# Patient Record
Sex: MALE | Race: WHITE | NOT HISPANIC OR LATINO | Employment: FULL TIME | RURAL
[De-identification: names, ages, dates, MRNs, and addresses within clinical notes are randomized per-mention and may not be internally consistent; named-entity substitution may affect disease eponyms.]

---

## 2020-01-14 ENCOUNTER — CLINICAL SUPPORT (OUTPATIENT)
Dept: FAMILY MEDICINE CLINIC | Facility: CLINIC | Age: 35
End: 2020-01-14

## 2020-01-14 VITALS
HEIGHT: 70 IN | SYSTOLIC BLOOD PRESSURE: 134 MMHG | WEIGHT: 179 LBS | OXYGEN SATURATION: 98 % | DIASTOLIC BLOOD PRESSURE: 86 MMHG | TEMPERATURE: 98.4 F | BODY MASS INDEX: 25.62 KG/M2 | RESPIRATION RATE: 18 BRPM | HEART RATE: 87 BPM

## 2020-01-14 DIAGNOSIS — Z02.4 ENCOUNTER FOR CDL (COMMERCIAL DRIVING LICENSE) EXAM: Primary | ICD-10-CM

## 2020-01-14 LAB
BILIRUB BLD-MCNC: NEGATIVE MG/DL
CLARITY, POC: CLEAR
COLOR UR: YELLOW
GLUCOSE UR STRIP-MCNC: NEGATIVE MG/DL
KETONES UR QL: NEGATIVE
LEUKOCYTE EST, POC: NEGATIVE
NITRITE UR-MCNC: NEGATIVE MG/ML
PH UR: 7 [PH] (ref 5–8)
PROT UR STRIP-MCNC: NEGATIVE MG/DL
RBC # UR STRIP: NEGATIVE /UL
SP GR UR: 1 (ref 1–1.03)
UROBILINOGEN UR QL: NORMAL

## 2020-01-14 PROCEDURE — DOTPHY: Performed by: FAMILY MEDICINE

## 2020-01-14 PROCEDURE — 81003 URINALYSIS AUTO W/O SCOPE: CPT | Performed by: FAMILY MEDICINE

## 2020-01-14 NOTE — PROGRESS NOTES
" Medical Examination    Subjective   Robert Whiteside is a 34 y.o. male who presents today for a  fitness determination physical exam. The patient reports no problems.  The following portions of the patient's history were reviewed and updated as appropriate: allergies, current medications, past family history, past medical history, past social history, past surgical history and problem list.  Review of Systems  A comprehensive review of systems was negative.    Objective    Vision:   Visual Acuity Screening    Right eye Left eye Both eyes   Without correction:      With correction: 20/20 20/20 20/20       Applicant can recognize and distinguish among traffic control signals and devices showing standard red, green, and ila colors.  Applicant has peripheral vision to 90 degrees in each eye.    Applicant meets visual acuity requirement only when wearing corrective lenses.    Monocular Vision?: No      Hearing:  Applicant can distinguish forced whisper at a distance of 5 feet with both ears         /86   Pulse 87   Temp 98.4 °F (36.9 °C)   Resp 18   Ht 177.8 cm (70\")   Wt 81.2 kg (179 lb)   SpO2 98%   BMI 25.68 kg/m²     General Appearance:    Alert, cooperative, no distress, appears stated age   Head:    Normocephalic, without obvious abnormality, atraumatic   Eyes:    PERRL, conjunctiva/corneas clear, EOM's intact, fundi     benign, both eyes        Ears:    Normal TM's and external ear canals, both ears   Nose:   Nares normal, septum midline, mucosa normal, no drainage    or sinus tenderness   Throat:   Lips, mucosa, and tongue normal; teeth and gums normal   Neck:   Supple, symmetrical, trachea midline, no adenopathy;        thyroid:  No enlargement/tenderness/nodules; no carotid    bruit or JVD   Back:     Symmetric, no curvature, ROM normal, no CVA tenderness   Lungs:     Clear to auscultation bilaterally, respirations unlabored   Chest wall:    No tenderness or " deformity   Heart:    Regular rate and rhythm, S1 and S2 normal, no murmur, rub   or gallop   Abdomen:     Soft, non-tender, bowel sounds active all four quadrants,     no masses, no organomegaly   Genitalia:    Normal male without lesion, discharge or tenderness   Rectal:    Normal tone, normal prostate, no masses or tenderness;    guaiac negative stool   Extremities:   Extremities normal, atraumatic, no cyanosis or edema   Pulses:   2+ and symmetric all extremities   Skin:   Skin color, texture, turgor normal, no rashes or lesions   Lymph nodes:   Cervical, supraclavicular, and axillary nodes normal   Neurologic:   CNII-XII intact. Normal strength, sensation and reflexes       throughout       Labs:  Lab Results   Component Value Date    SPECGRAV 1.000 (A) 01/14/2020    BILIRUBINUR Negative 01/14/2020    GLUCOSEU NEGATIVE 04/27/2018       Assessment/Plan   Healthy male exam.   Meets standards in 49 .41;  qualifies for 2 year certificate.     Medical examiners certificate completed and printed.  Return as needed.

## 2022-08-23 ENCOUNTER — APPOINTMENT (OUTPATIENT)
Dept: RESPIRATORY THERAPY | Facility: HOSPITAL | Age: 37
End: 2022-08-23

## 2022-08-23 ENCOUNTER — HOSPITAL ENCOUNTER (EMERGENCY)
Facility: HOSPITAL | Age: 37
Discharge: HOME | End: 2022-08-23
Attending: EMERGENCY MEDICINE | Admitting: EMERGENCY MEDICINE

## 2022-08-23 ENCOUNTER — APPOINTMENT (OUTPATIENT)
Dept: GENERAL RADIOLOGY | Facility: HOSPITAL | Age: 37
End: 2022-08-23

## 2022-08-23 VITALS
SYSTOLIC BLOOD PRESSURE: 139 MMHG | BODY MASS INDEX: 20.4 KG/M2 | WEIGHT: 145.72 LBS | HEIGHT: 71 IN | TEMPERATURE: 98.3 F | HEART RATE: 67 BPM | DIASTOLIC BLOOD PRESSURE: 79 MMHG | OXYGEN SATURATION: 99 % | RESPIRATION RATE: 17 BRPM

## 2022-08-23 DIAGNOSIS — R00.2 PALPITATION: Primary | ICD-10-CM

## 2022-08-23 LAB
ALBUMIN SERPL-MCNC: 5 G/DL (ref 3.5–5.2)
ALBUMIN/GLOB SERPL: 2.6 G/DL
ALP SERPL-CCNC: 48 U/L (ref 39–117)
ALT SERPL W P-5'-P-CCNC: 18 U/L (ref 1–41)
ANION GAP SERPL CALCULATED.3IONS-SCNC: 13 MMOL/L (ref 5–15)
AST SERPL-CCNC: 19 U/L (ref 1–40)
BASOPHILS # BLD AUTO: 0.1 10*3/MM3 (ref 0–0.2)
BASOPHILS NFR BLD AUTO: 0.7 % (ref 0–1.5)
BILIRUB SERPL-MCNC: 0.3 MG/DL (ref 0–1.2)
BUN SERPL-MCNC: 14 MG/DL (ref 6–20)
BUN/CREAT SERPL: 13.9 (ref 7–25)
CALCIUM SPEC-SCNC: 9.7 MG/DL (ref 8.6–10.5)
CHLORIDE SERPL-SCNC: 102 MMOL/L (ref 98–107)
CO2 SERPL-SCNC: 27 MMOL/L (ref 22–29)
CREAT SERPL-MCNC: 1.01 MG/DL (ref 0.76–1.27)
D DIMER PPP FEU-MCNC: <0.19 MG/L (FEU) (ref 0–0.59)
DEPRECATED RDW RBC AUTO: 41.6 FL (ref 37–54)
EGFRCR SERPLBLD CKD-EPI 2021: 98.8 ML/MIN/1.73
EOSINOPHIL # BLD AUTO: 0.1 10*3/MM3 (ref 0–0.4)
EOSINOPHIL NFR BLD AUTO: 1.1 % (ref 0.3–6.2)
ERYTHROCYTE [DISTWIDTH] IN BLOOD BY AUTOMATED COUNT: 13 % (ref 12.3–15.4)
ERYTHROCYTE [SEDIMENTATION RATE] IN BLOOD: 2 MM/HR (ref 0–15)
GLOBULIN UR ELPH-MCNC: 1.9 GM/DL
GLUCOSE SERPL-MCNC: 86 MG/DL (ref 65–99)
HCT VFR BLD AUTO: 41.5 % (ref 37.5–51)
HGB BLD-MCNC: 13.6 G/DL (ref 13–17.7)
LYMPHOCYTES # BLD AUTO: 3.3 10*3/MM3 (ref 0.7–3.1)
LYMPHOCYTES NFR BLD AUTO: 40.7 % (ref 19.6–45.3)
MAGNESIUM SERPL-MCNC: 1.9 MG/DL (ref 1.6–2.6)
MCH RBC QN AUTO: 31.1 PG (ref 26.6–33)
MCHC RBC AUTO-ENTMCNC: 32.8 G/DL (ref 31.5–35.7)
MCV RBC AUTO: 94.7 FL (ref 79–97)
MONOCYTES # BLD AUTO: 0.5 10*3/MM3 (ref 0.1–0.9)
MONOCYTES NFR BLD AUTO: 5.7 % (ref 5–12)
NEUTROPHILS NFR BLD AUTO: 4.2 10*3/MM3 (ref 1.7–7)
NEUTROPHILS NFR BLD AUTO: 51.8 % (ref 42.7–76)
NRBC BLD AUTO-RTO: 0 /100 WBC (ref 0–0.2)
PLATELET # BLD AUTO: 244 10*3/MM3 (ref 140–450)
PMV BLD AUTO: 7.6 FL (ref 6–12)
POTASSIUM SERPL-SCNC: 3.7 MMOL/L (ref 3.5–5.2)
PROT SERPL-MCNC: 6.9 G/DL (ref 6–8.5)
QT INTERVAL: 327 MS
RBC # BLD AUTO: 4.38 10*6/MM3 (ref 4.14–5.8)
SODIUM SERPL-SCNC: 142 MMOL/L (ref 136–145)
TROPONIN T SERPL-MCNC: <0.01 NG/ML (ref 0–0.03)
TSH SERPL DL<=0.05 MIU/L-ACNC: 1.13 UIU/ML (ref 0.27–4.2)
WBC NRBC COR # BLD: 8.1 10*3/MM3 (ref 3.4–10.8)

## 2022-08-23 PROCEDURE — 80050 GENERAL HEALTH PANEL: CPT | Performed by: EMERGENCY MEDICINE

## 2022-08-23 PROCEDURE — 93005 ELECTROCARDIOGRAM TRACING: CPT | Performed by: EMERGENCY MEDICINE

## 2022-08-23 PROCEDURE — 83735 ASSAY OF MAGNESIUM: CPT | Performed by: EMERGENCY MEDICINE

## 2022-08-23 PROCEDURE — 99283 EMERGENCY DEPT VISIT LOW MDM: CPT

## 2022-08-23 PROCEDURE — 84484 ASSAY OF TROPONIN QUANT: CPT | Performed by: EMERGENCY MEDICINE

## 2022-08-23 PROCEDURE — 93005 ELECTROCARDIOGRAM TRACING: CPT

## 2022-08-23 PROCEDURE — 85652 RBC SED RATE AUTOMATED: CPT | Performed by: EMERGENCY MEDICINE

## 2022-08-23 PROCEDURE — 85379 FIBRIN DEGRADATION QUANT: CPT | Performed by: EMERGENCY MEDICINE

## 2022-08-23 PROCEDURE — 71045 X-RAY EXAM CHEST 1 VIEW: CPT

## 2022-08-23 PROCEDURE — 93270 REMOTE 30 DAY ECG REV/REPORT: CPT | Performed by: EMERGENCY MEDICINE

## 2022-08-23 RX ORDER — SODIUM CHLORIDE 0.9 % (FLUSH) 0.9 %
10 SYRINGE (ML) INJECTION AS NEEDED
Status: DISCONTINUED | OUTPATIENT
Start: 2022-08-23 | End: 2022-08-23 | Stop reason: HOSPADM

## 2022-08-23 NOTE — ED NOTES
Pt reports no pain now, pt states earlier today he had an episode where his heart was beating irregularly. Pt states he has had this issue all his life but since last week he states it has changed. Pt states today he has dizziness and disorientation. Pt reports starting buspirone x 4 weeks ago, and lisinopril x 1 week ago.

## 2022-08-24 NOTE — DISCHARGE INSTRUCTIONS
Continue wearing this 30-day monitor.  Follow-up with the above cardiologist call tomorrow to set up appointment.  Call your primary care doctor tomorrow for follow-up.  No caffeine.  Monitor blood pressure daily record and take to your doctor.  Return for increasing chest pain neck arm jaw pain shortness of breath dizziness passing out fevers or any other new or worsening problems or concerns return immediately to the ER.

## 2022-08-24 NOTE — ED PROVIDER NOTES
Subjective   Chief complaint palpitations heart not beating right    History of present illness a 36-year-old male who complains of a  week history of heart not beating right since seems to be irregular and having extra beats or skipping beats.  He has no chest pain neck arm jaw pain he has little bit of lightheadedness and dizziness.  No shortness of breath.  He denies any vomiting.  No recent fever chills sweats cough congestion foreign travels no leg pain or swelling.  Nothing really seems make it better or worse is been moderate intermittent for a week worse last few days.  Patient has seen his doctor had elevated blood pressure was started on lisinopril a week ago.  And it seems that accelerated since being started on that medication.  Patient states he seems to feel it more when he lays on his right side.  Nothing really makes it better.  Patient is also been weaning down off methadone through the clinic in a controlled manner.  And recently started BuSpar 4 weeks ago.  Patient has been at home treating this symptomatically.  He has been set up for a Holter monitor and potential referral to a cardiologist by his primary care.  No other complaints or associated symptoms at this time          Review of Systems   Constitutional: Negative for chills and fever.   HENT: Negative for congestion and sinus pressure.    Eyes: Negative for photophobia and visual disturbance.   Respiratory: Negative for chest tightness and shortness of breath.    Cardiovascular: Positive for palpitations. Negative for chest pain and leg swelling.   Gastrointestinal: Negative for abdominal pain and vomiting.   Endocrine: Negative for cold intolerance and heat intolerance.   Genitourinary: Negative for difficulty urinating and dysuria.   Musculoskeletal: Negative for back pain and neck pain.   Skin: Negative for color change and rash.   Neurological: Positive for light-headedness. Negative for dizziness, facial asymmetry, speech difficulty  and headaches.   Psychiatric/Behavioral: Negative for agitation and confusion.       Past Medical History:   Diagnosis Date   • History of intravenous drug use in remission    • Positive hepatitis C antibody test    Hypertension    No Known Allergies    Past Surgical History:   Procedure Laterality Date   • HERNIA REPAIR Left 1986       Family History   Problem Relation Age of Onset   • Cervical cancer Mother    • Lung disease Maternal Grandmother    • Heart disease Maternal Grandfather    • Colon cancer Other        Social History     Socioeconomic History   • Marital status:    Tobacco Use   • Smoking status: Current Every Day Smoker   • Smokeless tobacco: Never Used   Substance and Sexual Activity   • Alcohol use: Not Currently   • Drug use: Yes     Types: Marijuana, Heroin     Prior to Admission medications    Not on File     Medications lisinopril BuSpar methadone      Objective   Physical Exam  Constitutional 36-year-old male awake alert no acute distress initial blood pressure 178/120 but recheck down to 141/85.  Heart rate 72 temperature 98.3 sats 100% on room air and respiratory rate 18.  HEENT extraocular muscles are intact pupils equal round reactive sclera clear.  Neck supple no adenopathy no JVD no bruits no meningeal signs no thyroid nodules.  Lungs clear no retraction no use of accessories.  Heart regular without murmur rub.  Abdomen soft without tenderness good bowel sounds no peritoneal findings or pulsatile masses.  Extremities pulses are equal throughout upper and lower extremities no edema cords or Homans' sign no evidence of DVT.  Skin is warm and dry without rashes or cellulitic changes.  Neurologic awake alert orientated x3 no facial asymmetry normal speech without focal weakness  Procedures           ED Course      Results for orders placed or performed during the hospital encounter of 08/23/22   Comprehensive Metabolic Panel    Specimen: Blood   Result Value Ref Range    Glucose 86  65 - 99 mg/dL    BUN 14 6 - 20 mg/dL    Creatinine 1.01 0.76 - 1.27 mg/dL    Sodium 142 136 - 145 mmol/L    Potassium 3.7 3.5 - 5.2 mmol/L    Chloride 102 98 - 107 mmol/L    CO2 27.0 22.0 - 29.0 mmol/L    Calcium 9.7 8.6 - 10.5 mg/dL    Total Protein 6.9 6.0 - 8.5 g/dL    Albumin 5.00 3.50 - 5.20 g/dL    ALT (SGPT) 18 1 - 41 U/L    AST (SGOT) 19 1 - 40 U/L    Alkaline Phosphatase 48 39 - 117 U/L    Total Bilirubin 0.3 0.0 - 1.2 mg/dL    Globulin 1.9 gm/dL    A/G Ratio 2.6 g/dL    BUN/Creatinine Ratio 13.9 7.0 - 25.0    Anion Gap 13.0 5.0 - 15.0 mmol/L    eGFR 98.8 >60.0 mL/min/1.73   Troponin    Specimen: Blood   Result Value Ref Range    Troponin T <0.010 0.000 - 0.030 ng/mL   D-dimer, Quantitative    Specimen: Blood   Result Value Ref Range    D-Dimer, Quantitative <0.19 0.00 - 0.59 mg/L (FEU)   Sedimentation Rate    Specimen: Blood   Result Value Ref Range    Sed Rate 2 0 - 15 mm/hr   TSH    Specimen: Blood   Result Value Ref Range    TSH 1.130 0.270 - 4.200 uIU/mL   Magnesium    Specimen: Blood   Result Value Ref Range    Magnesium 1.9 1.6 - 2.6 mg/dL   CBC Auto Differential    Specimen: Blood   Result Value Ref Range    WBC 8.10 3.40 - 10.80 10*3/mm3    RBC 4.38 4.14 - 5.80 10*6/mm3    Hemoglobin 13.6 13.0 - 17.7 g/dL    Hematocrit 41.5 37.5 - 51.0 %    MCV 94.7 79.0 - 97.0 fL    MCH 31.1 26.6 - 33.0 pg    MCHC 32.8 31.5 - 35.7 g/dL    RDW 13.0 12.3 - 15.4 %    RDW-SD 41.6 37.0 - 54.0 fl    MPV 7.6 6.0 - 12.0 fL    Platelets 244 140 - 450 10*3/mm3    Neutrophil % 51.8 42.7 - 76.0 %    Lymphocyte % 40.7 19.6 - 45.3 %    Monocyte % 5.7 5.0 - 12.0 %    Eosinophil % 1.1 0.3 - 6.2 %    Basophil % 0.7 0.0 - 1.5 %    Neutrophils, Absolute 4.20 1.70 - 7.00 10*3/mm3    Lymphocytes, Absolute 3.30 (H) 0.70 - 3.10 10*3/mm3    Monocytes, Absolute 0.50 0.10 - 0.90 10*3/mm3    Eosinophils, Absolute 0.10 0.00 - 0.40 10*3/mm3    Basophils, Absolute 0.10 0.00 - 0.20 10*3/mm3    nRBC 0.0 0.0 - 0.2 /100 WBC   ECG 12 Lead   Result  Value Ref Range    QT Interval 327 ms     XR Chest 1 View    Result Date: 8/23/2022   1. No acute cardiopulmonary disease.   Electronically Signed By-Gold Urbina MD On:8/23/2022 8:30 PM This report was finalized on [id number] by  Gold Urbina MD.    Medications   sodium chloride 0.9 % flush 10 mL (has no administration in time range)          EKG my interpretation normal sinus rhythm rate of 90 normal axis no hypertrophy QTC of 398 no acute changes otherwise nothing old to compare with but normal EKG                                  MDM  Number of Diagnoses or Management Options  Palpitation: new and requires workup  Diagnosis management comments: Medical decision making.  Patient IV established placed on a monitor and had the above exam evaluation monitor showed a sinus rhythm without ectopy.  EKG obtained reviewed by me showed a sinus rhythm without any acute changes it was normal.  Chest x-ray obtained reviewed by me as well as radiology is unremarkable.  Labs obtained reviewed by me chemistries TSH D-dimer troponin normal sed rate normal TSH normal magnesium normal CBC normal result reviewed by me.  Patient has a heart score of 1 he has had no dysrhythmia or ectopy on the monitor.  He was restarted on lisinopril which he feels is made this worse.  The patient is also weaning off methadone.  At this point I do not see evidence of acute myocardial infarction no evidence of acute DVT pulmonary embolism or dissection or sepsis.  There are a multitude of things that can happen palpitations and does not complete list.  We placed a 30-day monitor on the patient and will follow-up refer to cardiology.  We talked about what to return for.  He voices understanding was discharged home for outpatient management.       Amount and/or Complexity of Data Reviewed  Clinical lab tests: reviewed  Tests in the radiology section of CPT®: reviewed  Tests in the medicine section of CPT®: reviewed    Risk of Complications,  Morbidity, and/or Mortality  Presenting problems: high  Diagnostic procedures: high  Management options: high    Patient Progress  Patient progress: stable      Final diagnoses:   Palpitation       ED Disposition  ED Disposition     ED Disposition   Discharge    Condition   Stable    Comment   --             Daylin Han, APRN  [address] [facility]  [address]  [place] [address]  [phone]    In 1 day      Zaki Combs MD  [address] [place] [address]  [phone]    In 1 day           Medication List      No changes were made to your prescriptions during this visit.          Constantino Solares MD  08/23/22 2058

## 2022-09-01 ENCOUNTER — OFFICE VISIT (OUTPATIENT)
Dept: CARDIOLOGY | Facility: CLINIC | Age: 37
End: 2022-09-01

## 2022-09-01 VITALS
OXYGEN SATURATION: 100 % | HEART RATE: 83 BPM | HEIGHT: 71 IN | WEIGHT: 148 LBS | DIASTOLIC BLOOD PRESSURE: 84 MMHG | BODY MASS INDEX: 20.72 KG/M2 | SYSTOLIC BLOOD PRESSURE: 144 MMHG

## 2022-09-01 DIAGNOSIS — R07.2 PRECORDIAL CHEST PAIN: ICD-10-CM

## 2022-09-01 DIAGNOSIS — I10 ESSENTIAL HYPERTENSION: ICD-10-CM

## 2022-09-01 DIAGNOSIS — R00.2 PALPITATIONS: Primary | ICD-10-CM

## 2022-09-01 PROCEDURE — 99204 OFFICE O/P NEW MOD 45 MIN: CPT | Performed by: INTERNAL MEDICINE

## 2022-09-01 RX ORDER — MELOXICAM 15 MG/1
TABLET ORAL
COMMUNITY

## 2022-09-01 RX ORDER — NEBIVOLOL 5 MG/1
5 TABLET ORAL DAILY
Qty: 90 TABLET | Refills: 1 | Status: SHIPPED | OUTPATIENT
Start: 2022-09-01 | End: 2023-03-03

## 2022-09-01 RX ORDER — LISINOPRIL 10 MG/1
TABLET ORAL
COMMUNITY
Start: 2022-08-24 | End: 2023-02-09 | Stop reason: SDUPTHER

## 2022-09-01 RX ORDER — BUSPIRONE HYDROCHLORIDE 5 MG/1
5 TABLET ORAL 3 TIMES DAILY
COMMUNITY
Start: 2022-08-18 | End: 2023-02-09

## 2022-09-01 RX ORDER — ALBUTEROL SULFATE 90 UG/1
AEROSOL, METERED RESPIRATORY (INHALATION)
COMMUNITY
End: 2022-09-01

## 2022-09-01 RX ORDER — METHADONE HYDROCHLORIDE 10 MG/5ML
11 SOLUTION ORAL DAILY
COMMUNITY
End: 2023-02-09

## 2022-09-01 NOTE — PROGRESS NOTES
Date of Office Visit: 2022  Encounter Provider: Dr. Zaki Combs  Place of Service: [facility] [place]  Patient Name: Robert Whiteside  :1985  Daylin Han APRN    Chief Complaint   Patient presents with   • Palpitations   • Consult     History of Present Illness:    I am pleased to see Mr. Chong in my office today as a new consultation.    As you know, patient is 36-year-old white gentleman whose past medical history is significant for hypertension who is referred to me for symptom of chest pain and palpitation.    In 2022, patient developed symptom of chest pain and palpitation.  He was evaluated in the emergency room and his blood pressure was noted to be high greater than 180 mm systolic.  Patient was started on lisinopril 10 mg daily.  However patient continues to have chest pain.  Patient describes palpitation as a racing of the heart.  Patient palpitation and chest pain get worse with exertion.  Patient denies any orthopnea PND no syncope or presyncope.  Patient does complain of diaphoresis with chest pain.    Patient has quit smoking 2 months ago.  He does not abuse alcohol.    Recent EKG in emergency room shows normal sinus rhythm no ST changes.    I would recommend to proceed with Holter monitor.  I suspect patient may have atrial tachyarrhythmia.  I would also proceed with stress echocardiography.  Patient has significant risk factor        Past Medical History:   Diagnosis Date   • History of intravenous drug use in remission    • Hypertension    • Positive hepatitis C antibody test          Past Surgical History:   Procedure Laterality Date   • HERNIA REPAIR Left            Current Outpatient Medications:   •  busPIRone (BUSPAR) 5 MG tablet, Take 5 mg by mouth 3 (Three) Times a Day., Disp: , Rfl:   •  lisinopril (PRINIVIL,ZESTRIL) 10 MG tablet, , Disp: , Rfl:   •  meloxicam (MOBIC) 15 MG tablet, meloxicam 15 mg tablet  TAKE 1 TABLET BY MOUTH TWICE DAILY,  "Disp: , Rfl:   •  methadone (DOLOPHINE) 10 MG/5ML solution, Take 11 mg by mouth Daily., Disp: , Rfl:   •  sertraline (ZOLOFT) 50 MG tablet, , Disp: , Rfl:   •  nebivolol (Bystolic) 5 MG tablet, Take 1 tablet by mouth Daily., Disp: 90 tablet, Rfl: 1      Social History     Socioeconomic History   • Marital status:    Tobacco Use   • Smoking status: Former Smoker     Quit date: 2022     Years since quittin.1   • Smokeless tobacco: Never Used   Vaping Use   • Vaping Use: Never used   Substance and Sexual Activity   • Alcohol use: Not Currently   • Drug use: Not Currently     Types: Marijuana, Heroin     Comment: heroin , marijuana    • Sexual activity: Defer         Review of Systems   Constitutional: Negative for chills and fever.   HENT: Negative for ear discharge and nosebleeds.    Eyes: Negative for discharge and redness.   Cardiovascular: Positive for chest pain and palpitations. Negative for orthopnea, paroxysmal nocturnal dyspnea and syncope.   Respiratory: Positive for shortness of breath. Negative for cough and wheezing.    Endocrine: Negative for heat intolerance.   Skin: Negative for rash.   Musculoskeletal: Negative for arthritis and myalgias.   Gastrointestinal: Negative for abdominal pain, melena, nausea and vomiting.   Genitourinary: Negative for dysuria and hematuria.   Neurological: Negative for dizziness, light-headedness, numbness and tremors.   Psychiatric/Behavioral: Negative for depression. The patient is not nervous/anxious.        Procedures    Procedures    No orders to display           Objective:    /84 (BP Location: Left arm, Patient Position: Sitting, Cuff Size: Adult)   Pulse 83   Ht 180.3 cm (70.98\")   Wt 67.1 kg (148 lb)   SpO2 100%   BMI 20.65 kg/m²         Constitutional:       Appearance: Well-developed.   Eyes:      General: No scleral icterus.        Right eye: No discharge.   HENT:      Head: Normocephalic and atraumatic.   Neck:      Thyroid: No " thyromegaly.      Lymphadenopathy: No cervical adenopathy.   Pulmonary:      Effort: Pulmonary effort is normal. No respiratory distress.      Breath sounds: Normal breath sounds. No wheezing. No rales.   Cardiovascular:      Normal rate. Regular rhythm.      No gallop.   Abdominal:      Tenderness: There is no abdominal tenderness.   Skin:     Findings: No erythema or rash.   Neurological:      Mental Status: Alert and oriented to person, place, and time.             Assessment:       Diagnosis Plan   1. Palpitations  Adult Stress Echo W/ Cont or Stress Agent if Necessary Per Protocol    Holter Monitor - 24 Hour    nebivolol (Bystolic) 5 MG tablet   2. Precordial chest pain  Adult Stress Echo W/ Cont or Stress Agent if Necessary Per Protocol    Holter Monitor - 24 Hour    nebivolol (Bystolic) 5 MG tablet   3. Essential hypertension  Adult Stress Echo W/ Cont or Stress Agent if Necessary Per Protocol    Holter Monitor - 24 Hour    nebivolol (Bystolic) 5 MG tablet            Plan:       MDM:    1.  Palpitation:    Patient is having palpitation.  I would proceed with Holter monitor    2.  Chest pain:    Stress echocardiography would be done    3.  Hypertension:    Patient is started on Bystolic 5 mg daily along with lisinopril

## 2022-09-15 ENCOUNTER — APPOINTMENT (OUTPATIENT)
Dept: CARDIOLOGY | Facility: HOSPITAL | Age: 37
End: 2022-09-15

## 2022-09-15 ENCOUNTER — APPOINTMENT (OUTPATIENT)
Dept: RESPIRATORY THERAPY | Facility: HOSPITAL | Age: 37
End: 2022-09-15

## 2022-09-22 ENCOUNTER — TELEPHONE (OUTPATIENT)
Dept: CARDIOLOGY | Facility: CLINIC | Age: 37
End: 2022-09-22

## 2022-09-22 NOTE — TELEPHONE ENCOUNTER
PATIENT'S GIRLFRIEND CALLED IN ASKING FOR PAPERWORK TO BE SENT TO [facility]AL SURGEON AGAIN    SHE WOULD LIKE IT FAXED AGAIN AND WOULD ALSO LIKE FOR IT TO BE EMAILED TO  [email or web address]

## 2022-09-22 NOTE — TELEPHONE ENCOUNTER
Caller: MAMIE ELLISON    Relationship: Emergency Contact    Best call back number: [phone]    What form or medical record are you requesting: CARDIAC CLEARANCE    Who is requesting this form or medical record from you: TIERRA @ DR [facility] OFFICE    How would you like to receive the form or medical records (pick-up, mail, fax): FAX    If fax, what is the fax number: [phone]    Timeframe paperwork needed: ASAP, BY 2PM PREFERABLY    Additional notes: PT IS NEEDING CARDIAC CLEARANCE FOR DENTAL PROCEDURE TOMORROW - PT HAD APPT 9.1.22 AND DENTAL OFFICE REQUIRING CLEARANCE DUE TO BEING ESTABLISHED WITH CARDIOLOGY,  - FORM MUST SPECIFY CARDIAC CLEARANCE FOR GENERAL SURGERY ANESTHESIA

## 2022-09-23 ENCOUNTER — APPOINTMENT (OUTPATIENT)
Dept: RESPIRATORY THERAPY | Facility: HOSPITAL | Age: 37
End: 2022-09-23

## 2022-09-23 ENCOUNTER — APPOINTMENT (OUTPATIENT)
Dept: CARDIOLOGY | Facility: HOSPITAL | Age: 37
End: 2022-09-23

## 2022-10-05 PROCEDURE — 93272 ECG/REVIEW INTERPRET ONLY: CPT | Performed by: INTERNAL MEDICINE

## 2022-12-26 ENCOUNTER — HOSPITAL ENCOUNTER (EMERGENCY)
Facility: HOSPITAL | Age: 37
Discharge: HOME | End: 2022-12-27
Attending: EMERGENCY MEDICINE | Admitting: EMERGENCY MEDICINE

## 2022-12-26 DIAGNOSIS — R22.0 FACIAL SWELLING: Primary | ICD-10-CM

## 2022-12-26 DIAGNOSIS — K02.9 DENTAL DECAY: ICD-10-CM

## 2022-12-26 DIAGNOSIS — L03.211 FACIAL CELLULITIS: ICD-10-CM

## 2022-12-26 PROCEDURE — 96372 THER/PROPH/DIAG INJ SC/IM: CPT

## 2022-12-26 PROCEDURE — 25010000002 CEFTRIAXONE PER 250 MG: Performed by: EMERGENCY MEDICINE

## 2022-12-26 PROCEDURE — 99283 EMERGENCY DEPT VISIT LOW MDM: CPT

## 2022-12-26 RX ORDER — TRAMADOL HYDROCHLORIDE 50 MG/1
50 TABLET ORAL EVERY 6 HOURS PRN
Qty: 12 TABLET | Refills: 0 | Status: SHIPPED | OUTPATIENT
Start: 2022-12-26 | End: 2023-02-09

## 2022-12-26 RX ORDER — TRAMADOL HYDROCHLORIDE 50 MG/1
50 TABLET ORAL ONCE
Status: COMPLETED | OUTPATIENT
Start: 2022-12-26 | End: 2022-12-26

## 2022-12-26 RX ADMIN — LIDOCAINE HYDROCHLORIDE 1 G: 10 INJECTION, SOLUTION EPIDURAL; INFILTRATION; INTRACAUDAL; PERINEURAL at 23:21

## 2022-12-26 RX ADMIN — TRAMADOL HYDROCHLORIDE 50 MG: 50 TABLET, COATED ORAL at 23:20

## 2022-12-27 VITALS
HEART RATE: 68 BPM | RESPIRATION RATE: 17 BRPM | WEIGHT: 179.9 LBS | HEIGHT: 71 IN | TEMPERATURE: 98.2 F | OXYGEN SATURATION: 99 % | SYSTOLIC BLOOD PRESSURE: 172 MMHG | BODY MASS INDEX: 25.19 KG/M2 | DIASTOLIC BLOOD PRESSURE: 110 MMHG

## 2022-12-27 RX ORDER — CLONIDINE HYDROCHLORIDE 0.1 MG/1
0.1 TABLET ORAL ONCE
Status: COMPLETED | OUTPATIENT
Start: 2022-12-27 | End: 2022-12-27

## 2022-12-27 RX ADMIN — CLONIDINE HYDROCHLORIDE 0.1 MG: 0.1 TABLET ORAL at 00:34

## 2022-12-27 NOTE — ED PROVIDER NOTES
Subjective   History of Present Illness  37-year-old male with a history of dental issues presents today with the onset of swelling to the left side of his jaw.  He reports that he was started on ibuprofen 800 mg every 8 hours today and also states he is taking amoxicillin 500 mg 4 times daily.  He reports that he has has Tylenol as well.  Reports he still has pain.  He reports that he has had subjective fever but no chills.  He denies neck pain or stiffness.  He reports no nausea vomiting or diarrhea.  He states he has a history of hypertension but no history of previous cardiac murmur.        Review of Systems   HENT: Positive for dental problem. Negative for sore throat and trouble swallowing.    Eyes: Negative for discharge.   Cardiovascular: Negative for palpitations.   Musculoskeletal: Positive for neck pain. Negative for neck stiffness.   Hematological: Does not bruise/bleed easily.   All other systems reviewed and are negative.      Past Medical History:   Diagnosis Date   • History of intravenous drug use in remission    • Hypertension    • Positive hepatitis C antibody test    States that he has had previously negative HIV test    No Known Allergies    Past Surgical History:   Procedure Laterality Date   • HERNIA REPAIR Left        Family History   Problem Relation Age of Onset   • Cervical cancer Mother    • Lung disease Maternal Grandmother    • Heart disease Maternal Grandfather    • Colon cancer Other        Social History     Socioeconomic History   • Marital status:    Tobacco Use   • Smoking status: Former     Types: Cigarettes     Quit date: 2022     Years since quittin.4   • Smokeless tobacco: Never   Vaping Use   • Vaping Use: Never used   Substance and Sexual Activity   • Alcohol use: Not Currently   • Drug use: Not Currently     Types: Marijuana, Heroin     Comment: heroin , marijuana    • Sexual activity: Defer           Objective   Physical Exam  Alert nontoxic  Romy Coma Scale 15  HEENT: Pupils reactive to light conjunctiva not injected tympanic membranes and nares unremarkable oropharynx shows multiple areas of dental decay.  There appears to be some gingival swelling around tooth #19 and 20.  There is a previous dental erosion, caries, and extraction.  There is no periapical sinus identified  Neck: Supple midline trachea there is tender submandibular and upper anterior cervical lymphadenopathy on the left no range of motion limitation in the TMJ  Procedures           ED Course      Labs Reviewed - No data to display  Medications - No data to display  No radiology results for the last day                                       MDM  Number of Diagnoses or Management Options  Risk of Complications, Morbidity, and/or Mortality  Presenting problems: high  Diagnostic procedures: high  Management options: high  General comments: Patient was discharged a prescription for tramadol, quantity 12.  He was injected with ceftriaxone prior to discharge.  He was stable at discharge and vocalized understanding of discharge instructions and warning    Patient Progress  Patient progress: improved      Final diagnoses:   Facial swelling   Facial cellulitis   Dental decay       ED Disposition  ED Disposition     ED Disposition   Discharge    Condition   Stable    Comment   --             No follow-up provider specified.       Medication List      New Prescriptions    traMADol 50 MG tablet  Commonly known as: ULTRAM  Take 1 tablet by mouth Every 6 (Six) Hours As Needed for Moderate Pain.           Where to Get Your Medications      These medications were sent to [facility] [address] [place] [address] - [phone] [facility] [phone] [address]  [address]  [place] [address]    Phone: [phone]   · traMADol 50 MG tablet          German Yee MD  12/26/22 8560

## 2022-12-27 NOTE — DISCHARGE INSTRUCTIONS
Continue amoxicillin and ibuprofen  Elevate head tonight  Call dental follow-up in the morning  Follow-up with primary care provider

## 2023-02-08 PROBLEM — S22.21XS: Status: ACTIVE | Noted: 2018-05-04

## 2023-02-08 PROBLEM — I82.409 ACUTE EMBOLISM AND THROMBOSIS OF UNSPECIFIED DEEP VEINS OF UNSPECIFIED LOWER EXTREMITY: Status: ACTIVE | Noted: 2018-05-04

## 2023-02-08 PROBLEM — L02.91 CUTANEOUS ABSCESS, UNSPECIFIED: Status: ACTIVE | Noted: 2018-05-04

## 2023-02-08 PROBLEM — B18.2 CHRONIC VIRAL HEPATITIS C: Status: ACTIVE | Noted: 2018-05-04

## 2023-02-08 PROBLEM — F19.10 OTHER PSYCHOACTIVE SUBSTANCE ABUSE, UNCOMPLICATED: Status: ACTIVE | Noted: 2018-05-04
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
## 2023-02-08 NOTE — PROGRESS NOTES
Date of Office Visit: 2023  Encounter Provider: Dr. Zaki Combs  Place of Service: [facility] CARDIOLOGY [place]  Patient Name: Robert Whiteside  :1985  Daylin Han APRN    Chief Complaint   Patient presents with   • Palpitations   • Hypertension   • Follow-up     History of Present Illness    I am pleased to see Mr. Chong in my office today as a follow-up.    As you know, patient is 37-year-old white gentleman whose past medical history is significant for hypertension who was referred to me for symptom of chest pain and palpitation.    In 2022, patient developed symptom of chest pain and palpitation.  He was evaluated in the emergency room and his blood pressure was noted to be high greater than 180 mm systolic.  Patient was started on lisinopril 10 mg daily    In 2022, I saw the patient in the office for his symptom of chest pain and blood pressure.  I added Bystolic 5 mg daily for his symptom of palpitation and blood pressure.  Patient was recommended to proceed with stress echocardiography.  Patient also underwent Holter monitor.    Holter monitor showed predominantly sinus rhythm.  No SVT or VT was noted isolated premature contraction was noted.    Patient came today.  Patient blood pressure is very high.  Patient continues to drink Mountain Dew.  Patient did not go for stress echocardiography.  He canceled the test.  Patient denies any chest pain.  Patient has mild shortness of breath.    At this stage I will increase lisinopril to 10 mg twice daily.  Continue Bystolic at current dose.  Patient is advised to call me in 1 month and if blood pressures are still high I will increase lisinopril to 20 mg twice daily.  Consider adding Cardizem CD in future if needed.        Past Medical History:   Diagnosis Date   • History of intravenous drug use in remission    • Hypertension    • Positive hepatitis C antibody test          Past Surgical History:   Procedure Laterality  Date   • HERNIA REPAIR Left            Current Outpatient Medications:   •  lisinopril (PRINIVIL,ZESTRIL) 10 MG tablet, Take 1 tablet by mouth 2 (Two) Times a Day., Disp: 180 tablet, Rfl: 1  •  meloxicam (MOBIC) 15 MG tablet, meloxicam 15 mg tablet  TAKE 1 TABLET BY MOUTH TWICE DAILY, Disp: , Rfl:   •  nebivolol (Bystolic) 5 MG tablet, Take 1 tablet by mouth Daily., Disp: 90 tablet, Rfl: 1  •  sertraline (ZOLOFT) 50 MG tablet, , Disp: , Rfl:       Social History     Socioeconomic History   • Marital status:    Tobacco Use   • Smoking status: Former     Types: Cigarettes     Quit date: 2022     Years since quittin.6   • Smokeless tobacco: Never   Vaping Use   • Vaping Use: Never used   Substance and Sexual Activity   • Alcohol use: Not Currently   • Drug use: Not Currently     Types: Marijuana, Heroin     Comment: heroin , marijuana    • Sexual activity: Defer         Review of Systems   Constitutional: Negative for chills and fever.   HENT: Negative for ear discharge and nosebleeds.    Eyes: Negative for discharge and redness.   Cardiovascular: Negative for chest pain, orthopnea, palpitations, paroxysmal nocturnal dyspnea and syncope.   Respiratory: Negative for cough, shortness of breath and wheezing.    Endocrine: Negative for heat intolerance.   Skin: Negative for rash.   Musculoskeletal: Negative for arthritis and myalgias.   Gastrointestinal: Negative for abdominal pain, melena, nausea and vomiting.   Genitourinary: Negative for dysuria and hematuria.   Neurological: Negative for dizziness, light-headedness, numbness and tremors.   Psychiatric/Behavioral: Negative for depression. The patient is not nervous/anxious.        Procedures      ECG 12 Lead    Date/Time: 2023 2:08 PM  Performed by: Zaki Combs MD  Authorized by: Zaki Combs MD   Comparison: compared with previous ECG   Similar to previous ECG  Rhythm: sinus rhythm    Clinical impression: normal ECG            ECG 12  "Lead    (Results Pending)           Objective:    BP (!) 160/105 (BP Location: Right arm, Patient Position: Sitting, Cuff Size: Large Adult)   Pulse 64   Ht 180.3 cm (70.98\")   Wt 81.2 kg (179 lb)   SpO2 97%   BMI 24.98 kg/m²         Constitutional:       Appearance: Well-developed.   Eyes:      General: No scleral icterus.        Right eye: No discharge.   HENT:      Head: Normocephalic and atraumatic.   Neck:      Thyroid: No thyromegaly.      Lymphadenopathy: No cervical adenopathy.   Pulmonary:      Effort: Pulmonary effort is normal. No respiratory distress.      Breath sounds: Normal breath sounds. No wheezing. No rales.   Cardiovascular:      Normal rate. Regular rhythm.      No gallop.   Abdominal:      Tenderness: There is no abdominal tenderness.   Skin:     Findings: No erythema or rash.   Neurological:      Mental Status: Alert and oriented to person, place, and time.             Assessment:       Diagnosis Plan   1. Palpitations  ECG 12 Lead    lisinopril (PRINIVIL,ZESTRIL) 10 MG tablet      2. Essential hypertension  lisinopril (PRINIVIL,ZESTRIL) 10 MG tablet               Plan:       MDM:    1.  Hypertension:    Increase lisinopril to 20 mg a day with 10 mg twice daily.  Blood pressure monitoring is recommended.  If needed increase lisinopril to 20 mg twice daily.    2.  Palpitation:    Symptoms are contained with Bystolic.  "

## 2023-02-09 ENCOUNTER — OFFICE VISIT (OUTPATIENT)
Dept: CARDIOLOGY | Facility: CLINIC | Age: 38
End: 2023-02-09
Payer: MEDICAID

## 2023-02-09 VITALS
HEIGHT: 71 IN | BODY MASS INDEX: 25.06 KG/M2 | HEART RATE: 64 BPM | OXYGEN SATURATION: 97 % | DIASTOLIC BLOOD PRESSURE: 105 MMHG | WEIGHT: 179 LBS | SYSTOLIC BLOOD PRESSURE: 160 MMHG

## 2023-02-09 DIAGNOSIS — I10 ESSENTIAL HYPERTENSION: ICD-10-CM

## 2023-02-09 DIAGNOSIS — R00.2 PALPITATIONS: Primary | ICD-10-CM

## 2023-02-09 PROCEDURE — 99213 OFFICE O/P EST LOW 20 MIN: CPT | Performed by: INTERNAL MEDICINE

## 2023-02-09 PROCEDURE — 93000 ELECTROCARDIOGRAM COMPLETE: CPT | Performed by: INTERNAL MEDICINE

## 2023-02-09 RX ORDER — LISINOPRIL 10 MG/1
10 TABLET ORAL 2 TIMES DAILY
Qty: 180 TABLET | Refills: 1 | Status: SHIPPED | OUTPATIENT
Start: 2023-02-09 | End: 2023-03-06

## 2023-03-03 DIAGNOSIS — R00.2 PALPITATIONS: ICD-10-CM

## 2023-03-03 DIAGNOSIS — R07.2 PRECORDIAL CHEST PAIN: ICD-10-CM

## 2023-03-03 DIAGNOSIS — I10 ESSENTIAL HYPERTENSION: ICD-10-CM

## 2023-03-03 RX ORDER — NEBIVOLOL 5 MG/1
TABLET ORAL
Qty: 90 TABLET | Refills: 0 | Status: SHIPPED | OUTPATIENT
Start: 2023-03-03

## 2023-03-06 RX ORDER — LISINOPRIL 20 MG/1
10 TABLET ORAL 2 TIMES DAILY
Qty: 90 TABLET | Refills: 1 | Status: SHIPPED | OUTPATIENT
Start: 2023-03-06 | End: 2023-03-17

## 2023-03-17 RX ORDER — LISINOPRIL 40 MG/1
40 TABLET ORAL DAILY
Qty: 90 TABLET | Refills: 3 | Status: SHIPPED | OUTPATIENT
Start: 2023-03-17

## 2023-04-13 NOTE — PROGRESS NOTES
Date of Office Visit: 2023  Encounter Provider: Dr. Zaki Combs  Place of Service: [facility] CARDIOLOGY [place]  Patient Name: Robert Whiteside  :1985  Daylin Han APRN    Chief Complaint   Patient presents with   • Hypertension   • Palpitations   • Follow-up     History of Present Illness    I am pleased to see Mr. Chong in my office today as a follow-up.    As you know, patient is 37-year-old white gentleman whose past medical history is significant for hypertension who was referred to me for symptom of chest pain and palpitation.    In 2022, patient developed symptom of chest pain and palpitation.  He was evaluated in the emergency room and his blood pressure was noted to be high greater than 180 mm systolic.  Patient was started on lisinopril 10 mg daily    In 2022, I saw the patient in the office for his symptom of chest pain and blood pressure.  I added Bystolic 5 mg daily for his symptom of palpitation and blood pressure.  Patient was recommended to proceed with stress echocardiography.  Patient also underwent Holter monitor.    Patient came today for follow-up he brought his blood pressure logbook and all the blood pressure readings are very high.  Unfortunately patient did not go for stress test because of poorly controlled hypertension.  Patient denies any chest pain.  No orthopnea PND no syncope or presyncope.  No leg edema noted.    Patient is taking lisinopril 20 mg twice daily.  I will discontinue Bystolic.  I would start Cardizem CD1 80 mg daily.  Hydrochlorothiazide 25 mg daily would be started.  Patient is using significant amount of salt in his diet.  I advised the patient to discontinue it.  Consider hydralazine in future        Past Medical History:   Diagnosis Date   • History of intravenous drug use in remission    • Hypertension    • Positive hepatitis C antibody test          Past Surgical History:   Procedure Laterality Date   • HERNIA REPAIR  Left            Current Outpatient Medications:   •  lisinopril (PRINIVIL,ZESTRIL) 40 MG tablet, Take 1 tablet by mouth Daily., Disp: 90 tablet, Rfl: 3  •  meloxicam (MOBIC) 15 MG tablet, meloxicam 15 mg tablet  TAKE 1 TABLET BY MOUTH TWICE DAILY, Disp: , Rfl:   •  sertraline (ZOLOFT) 50 MG tablet, , Disp: , Rfl:   •  dilTIAZem CD (Cardizem CD) 180 MG 24 hr capsule, Take 1 capsule by mouth Daily., Disp: 90 capsule, Rfl: 1  •  hydroCHLOROthiazide (HYDRODIURIL) 25 MG tablet, Take 1 tablet by mouth Daily., Disp: 90 tablet, Rfl: 3      Social History     Socioeconomic History   • Marital status:    Tobacco Use   • Smoking status: Former     Packs/day: 1.00     Types: Cigarettes     Quit date: 2022     Years since quittin.7   • Smokeless tobacco: Never   Vaping Use   • Vaping Use: Never used   Substance and Sexual Activity   • Alcohol use: Not Currently   • Drug use: Not Currently     Types: Marijuana, Heroin     Comment: heroin , marijuana    • Sexual activity: Defer         Review of Systems   Constitutional: Negative for chills and fever.   HENT: Negative for ear discharge and nosebleeds.    Eyes: Negative for discharge and redness.   Cardiovascular: Negative for chest pain, orthopnea, palpitations, paroxysmal nocturnal dyspnea and syncope.   Respiratory: Negative for cough, shortness of breath and wheezing.    Endocrine: Negative for heat intolerance.   Skin: Negative for rash.   Musculoskeletal: Negative for arthritis and myalgias.   Gastrointestinal: Negative for abdominal pain, melena, nausea and vomiting.   Genitourinary: Negative for dysuria and hematuria.   Neurological: Negative for dizziness, light-headedness, numbness and tremors.   Psychiatric/Behavioral: Negative for depression. The patient is not nervous/anxious.        Procedures    Procedures    No orders to display           Objective:    BP (!) 177/116 (BP Location: Right arm, Patient Position: Sitting, Cuff Size: Large  "Adult)   Pulse 63   Ht 180.3 cm (70.98\")   Wt 81.6 kg (180 lb)   SpO2 100%   BMI 25.12 kg/m²         Constitutional:       Appearance: Well-developed.   Eyes:      General: No scleral icterus.        Right eye: No discharge.   HENT:      Head: Normocephalic and atraumatic.   Neck:      Thyroid: No thyromegaly.      Lymphadenopathy: No cervical adenopathy.   Pulmonary:      Effort: Pulmonary effort is normal. No respiratory distress.      Breath sounds: Normal breath sounds. No wheezing. No rales.   Cardiovascular:      Normal rate. Regular rhythm.      No gallop.   Abdominal:      Tenderness: There is no abdominal tenderness.   Skin:     Findings: No erythema or rash.   Neurological:      Mental Status: Alert and oriented to person, place, and time.             Assessment:       Diagnosis Plan   1. Palpitations  dilTIAZem CD (Cardizem CD) 180 MG 24 hr capsule    hydroCHLOROthiazide (HYDRODIURIL) 25 MG tablet    Adult Transthoracic Echo Complete W/ Cont if Necessary Per Protocol      2. Benign essential HTN  dilTIAZem CD (Cardizem CD) 180 MG 24 hr capsule    hydroCHLOROthiazide (HYDRODIURIL) 25 MG tablet    Adult Transthoracic Echo Complete W/ Cont if Necessary Per Protocol               Plan:       MDM:    1.  Hypertension:    I will start Cardizem  mg daily.  Add hydrochlorothiazide.  Patient is advised to decrease salt intake    2.  Palpitation:    His palpitations are much better.  Continue Cardizem CD discontinue Bystolic.  "

## 2023-04-14 ENCOUNTER — OFFICE VISIT (OUTPATIENT)
Dept: CARDIOLOGY | Facility: CLINIC | Age: 38
End: 2023-04-14
Payer: MEDICAID

## 2023-04-14 VITALS
WEIGHT: 180 LBS | HEIGHT: 71 IN | DIASTOLIC BLOOD PRESSURE: 116 MMHG | HEART RATE: 63 BPM | SYSTOLIC BLOOD PRESSURE: 177 MMHG | BODY MASS INDEX: 25.2 KG/M2 | OXYGEN SATURATION: 100 %

## 2023-04-14 DIAGNOSIS — I10 BENIGN ESSENTIAL HTN: ICD-10-CM

## 2023-04-14 DIAGNOSIS — R00.2 PALPITATIONS: Primary | ICD-10-CM

## 2023-04-14 PROCEDURE — 1160F RVW MEDS BY RX/DR IN RCRD: CPT | Performed by: INTERNAL MEDICINE

## 2023-04-14 PROCEDURE — 99213 OFFICE O/P EST LOW 20 MIN: CPT | Performed by: INTERNAL MEDICINE

## 2023-04-14 PROCEDURE — 3077F SYST BP >= 140 MM HG: CPT | Performed by: INTERNAL MEDICINE

## 2023-04-14 PROCEDURE — 3080F DIAST BP >= 90 MM HG: CPT | Performed by: INTERNAL MEDICINE

## 2023-04-14 PROCEDURE — 1159F MED LIST DOCD IN RCRD: CPT | Performed by: INTERNAL MEDICINE

## 2023-04-14 RX ORDER — DILTIAZEM HYDROCHLORIDE 180 MG/1
180 CAPSULE, COATED, EXTENDED RELEASE ORAL DAILY
Qty: 90 CAPSULE | Refills: 1 | Status: SHIPPED | OUTPATIENT
Start: 2023-04-14

## 2023-04-14 RX ORDER — HYDROCHLOROTHIAZIDE 25 MG/1
25 TABLET ORAL DAILY
Qty: 90 TABLET | Refills: 3 | Status: SHIPPED | OUTPATIENT
Start: 2023-04-14

## 2023-07-27 ENCOUNTER — HOSPITAL ENCOUNTER (OUTPATIENT)
Dept: CARDIOLOGY | Facility: HOSPITAL | Age: 38
Discharge: HOME | End: 2023-07-27
Payer: MEDICAID

## 2023-07-27 VITALS
SYSTOLIC BLOOD PRESSURE: 138 MMHG | BODY MASS INDEX: 25.31 KG/M2 | WEIGHT: 180.78 LBS | DIASTOLIC BLOOD PRESSURE: 79 MMHG | HEIGHT: 71 IN

## 2023-07-27 DIAGNOSIS — I10 BENIGN ESSENTIAL HTN: ICD-10-CM

## 2023-07-27 DIAGNOSIS — R00.2 PALPITATIONS: ICD-10-CM

## 2023-07-27 LAB
BH CV ECHO MEAS - ACS: 1.79 CM
BH CV ECHO MEAS - AO MAX PG: 7.5 MMHG
BH CV ECHO MEAS - AO MEAN PG: 4 MMHG
BH CV ECHO MEAS - AO ROOT DIAM: 2.9 CM
BH CV ECHO MEAS - AO V2 MAX: 136.8 CM/SEC
BH CV ECHO MEAS - AO V2 VTI: 25.6 CM
BH CV ECHO MEAS - AVA(I,D): 2.6 CM2
BH CV ECHO MEAS - EDV(CUBED): 111.9 ML
BH CV ECHO MEAS - EDV(MOD-SP4): 90.2 ML
BH CV ECHO MEAS - EF(MOD-BP): 55 %
BH CV ECHO MEAS - EF(MOD-SP4): 54.1 %
BH CV ECHO MEAS - ESV(CUBED): 40.5 ML
BH CV ECHO MEAS - ESV(MOD-SP4): 41.4 ML
BH CV ECHO MEAS - FS: 28.7 %
BH CV ECHO MEAS - IVS/LVPW: 1.01 CM
BH CV ECHO MEAS - IVSD: 1 CM
BH CV ECHO MEAS - LA DIMENSION: 3 CM
BH CV ECHO MEAS - LV DIASTOLIC VOL/BSA (35-75): 44.7 CM2
BH CV ECHO MEAS - LV MASS(C)D: 171.2 GRAMS
BH CV ECHO MEAS - LV MAX PG: 4.2 MMHG
BH CV ECHO MEAS - LV MEAN PG: 2.19 MMHG
BH CV ECHO MEAS - LV SYSTOLIC VOL/BSA (12-30): 20.5 CM2
BH CV ECHO MEAS - LV V1 MAX: 102.3 CM/SEC
BH CV ECHO MEAS - LV V1 VTI: 19.2 CM
BH CV ECHO MEAS - LVIDD: 4.8 CM
BH CV ECHO MEAS - LVIDS: 3.4 CM
BH CV ECHO MEAS - LVOT AREA: 3.5 CM2
BH CV ECHO MEAS - LVOT DIAM: 2.12 CM
BH CV ECHO MEAS - LVPWD: 1 CM
BH CV ECHO MEAS - MR MAX PG: 67.6 MMHG
BH CV ECHO MEAS - MR MAX VEL: 411 CM/SEC
BH CV ECHO MEAS - MV A MAX VEL: 59.5 CM/SEC
BH CV ECHO MEAS - MV DEC SLOPE: 294.2 CM/SEC2
BH CV ECHO MEAS - MV DEC TIME: 0.24 MSEC
BH CV ECHO MEAS - MV E MAX VEL: 69.8 CM/SEC
BH CV ECHO MEAS - MV E/A: 1.17
BH CV ECHO MEAS - MV MAX PG: 2.47 MMHG
BH CV ECHO MEAS - MV MEAN PG: 1.06 MMHG
BH CV ECHO MEAS - MV V2 VTI: 27.7 CM
BH CV ECHO MEAS - MVA(VTI): 2.45 CM2
BH CV ECHO MEAS - PA ACC TIME: 0.09 SEC
BH CV ECHO MEAS - PA V2 MAX: 119.4 CM/SEC
BH CV ECHO MEAS - PI END-D VEL: 138 CM/SEC
BH CV ECHO MEAS - PULM A REVS DUR: 0.07 SEC
BH CV ECHO MEAS - PULM A REVS VEL: 27 CM/SEC
BH CV ECHO MEAS - PULM DIAS VEL: 59.1 CM/SEC
BH CV ECHO MEAS - PULM S/D: 0.8
BH CV ECHO MEAS - PULM SYS VEL: 47.5 CM/SEC
BH CV ECHO MEAS - RAP SYSTOLE: 10 MMHG
BH CV ECHO MEAS - RVSP: 39.2 MMHG
BH CV ECHO MEAS - SI(MOD-SP4): 24.2 ML/M2
BH CV ECHO MEAS - SV(LVOT): 67.9 ML
BH CV ECHO MEAS - SV(MOD-SP4): 48.8 ML
BH CV ECHO MEAS - TAPSE (>1.6): 2.6 CM
BH CV ECHO MEAS - TR MAX PG: 29.2 MMHG
BH CV ECHO MEAS - TR MAX VEL: 264.2 CM/SEC
BH CV XLRA - RV BASE: 3.3 CM
BH CV XLRA - RV MID: 1.9 CM

## 2023-07-27 PROCEDURE — 93306 TTE W/DOPPLER COMPLETE: CPT

## 2023-08-08 LAB
BH CV ECHO MEAS - ACS: 1.79 CM
BH CV ECHO MEAS - AO MAX PG: 7.5 MMHG
BH CV ECHO MEAS - AO MEAN PG: 4 MMHG
BH CV ECHO MEAS - AO ROOT DIAM: 2.9 CM
BH CV ECHO MEAS - AO V2 MAX: 136.8 CM/SEC
BH CV ECHO MEAS - AO V2 VTI: 25.6 CM
BH CV ECHO MEAS - AVA(I,D): 2.6 CM2
BH CV ECHO MEAS - EDV(CUBED): 111.9 ML
BH CV ECHO MEAS - EDV(MOD-SP4): 90.2 ML
BH CV ECHO MEAS - EF(MOD-BP): 55 %
BH CV ECHO MEAS - EF(MOD-SP4): 54.1 %
BH CV ECHO MEAS - ESV(CUBED): 40.5 ML
BH CV ECHO MEAS - ESV(MOD-SP4): 41.4 ML
BH CV ECHO MEAS - FS: 28.7 %
BH CV ECHO MEAS - IVS/LVPW: 1.01 CM
BH CV ECHO MEAS - IVSD: 1 CM
BH CV ECHO MEAS - LA DIMENSION: 3 CM
BH CV ECHO MEAS - LV DIASTOLIC VOL/BSA (35-75): 44.7 CM2
BH CV ECHO MEAS - LV MASS(C)D: 171.2 GRAMS
BH CV ECHO MEAS - LV MAX PG: 4.2 MMHG
BH CV ECHO MEAS - LV MEAN PG: 2.19 MMHG
BH CV ECHO MEAS - LV SYSTOLIC VOL/BSA (12-30): 20.5 CM2
BH CV ECHO MEAS - LV V1 MAX: 102.3 CM/SEC
BH CV ECHO MEAS - LV V1 VTI: 19.2 CM
BH CV ECHO MEAS - LVIDD: 4.8 CM
BH CV ECHO MEAS - LVIDS: 3.4 CM
BH CV ECHO MEAS - LVOT AREA: 3.5 CM2
BH CV ECHO MEAS - LVOT DIAM: 2.12 CM
BH CV ECHO MEAS - LVPWD: 1 CM
BH CV ECHO MEAS - MR MAX PG: 67.6 MMHG
BH CV ECHO MEAS - MR MAX VEL: 411 CM/SEC
BH CV ECHO MEAS - MV A MAX VEL: 59.5 CM/SEC
BH CV ECHO MEAS - MV DEC SLOPE: 294.2 CM/SEC2
BH CV ECHO MEAS - MV DEC TIME: 0.24 MSEC
BH CV ECHO MEAS - MV E MAX VEL: 69.8 CM/SEC
BH CV ECHO MEAS - MV E/A: 1.17
BH CV ECHO MEAS - MV MAX PG: 2.47 MMHG
BH CV ECHO MEAS - MV MEAN PG: 1.06 MMHG
BH CV ECHO MEAS - MV V2 VTI: 27.7 CM
BH CV ECHO MEAS - MVA(VTI): 2.45 CM2
BH CV ECHO MEAS - PA ACC TIME: 0.09 SEC
BH CV ECHO MEAS - PA V2 MAX: 119.4 CM/SEC
BH CV ECHO MEAS - PI END-D VEL: 138 CM/SEC
BH CV ECHO MEAS - PULM A REVS DUR: 0.07 SEC
BH CV ECHO MEAS - PULM A REVS VEL: 27 CM/SEC
BH CV ECHO MEAS - PULM DIAS VEL: 59.1 CM/SEC
BH CV ECHO MEAS - PULM S/D: 0.8
BH CV ECHO MEAS - PULM SYS VEL: 47.5 CM/SEC
BH CV ECHO MEAS - RAP SYSTOLE: 10 MMHG
BH CV ECHO MEAS - RVSP: 38 MMHG
BH CV ECHO MEAS - SI(MOD-SP4): 24.2 ML/M2
BH CV ECHO MEAS - SV(LVOT): 67.9 ML
BH CV ECHO MEAS - SV(MOD-SP4): 48.8 ML
BH CV ECHO MEAS - TAPSE (>1.6): 2.6 CM
BH CV ECHO MEAS - TR MAX PG: 28 MMHG
BH CV ECHO MEAS - TR MAX VEL: 264.2 CM/SEC
BH CV XLRA - RV BASE: 3.3 CM
BH CV XLRA - RV MID: 1.9 CM

## 2023-08-23 NOTE — PROGRESS NOTES
Date of Office Visit: 2023  Encounter Provider: Dr. Zaki Combs  Place of Service: [facility] CARDIOLOGY [place]  Patient Name: Robert Whiteside  :1985  Daylin Han APRN    Chief Complaint   Patient presents with    Palpitations    Hypertension    Follow-up     History of Present Illness    I am pleased to see Mr. Chong in my office today as a follow-up.    As you know, patient is 37-year-old white gentleman whose past medical history is significant for hypertension who was referred to me for symptom of chest pain and palpitation.    In 2022, patient developed symptom of chest pain and palpitation.  He was evaluated in the emergency room and his blood pressure was noted to be high greater than 180 mm systolic.  Patient was started on lisinopril 10 mg daily    In 2022, I saw the patient in the office for his symptom of chest pain and blood pressure.  I added Bystolic 5 mg daily for his symptom of palpitation and blood pressure.  Patient was recommended to proceed with stress echocardiography.  Patient also underwent Holter monitor.    In 2023, patient underwent echocardiogram which showed normal left ventricular size and function with EF of 55 to 60% and no significant valvular heart disease noted.  Holter monitor showed isolated PVCs no significant arrhythmia burden noted.  Insurance company and refuted stress test.    Patient came today for follow-up.  After adjustment of the medication has been blood pressure is very well controlled.  Patient denies any symptom of chest pain or tightness or heaviness.  Patient denies any orthopnea,.  Syncope or presyncope.  No leg edema noted.    EKG showed normal sinus rhythm with heart rate of 95 bpm    I am very pleased with the patient progress and control of blood pressure.  Patient is advised to continue monitoring the blood pressure.  Decrease salt intake.  Patient is advised to continue lisinopril and  diltiazem.        Past Medical History:   Diagnosis Date    History of intravenous drug use in remission     Hypertension     Positive hepatitis C antibody test          Past Surgical History:   Procedure Laterality Date    HERNIA REPAIR Left            Current Outpatient Medications:     dilTIAZem CD (CARDIZEM CD) 180 MG 24 hr capsule, Take 1 capsule by mouth Daily., Disp: , Rfl:     hydroCHLOROthiazide (HYDRODIURIL) 25 MG tablet, Take 1 tablet by mouth Daily., Disp: 90 tablet, Rfl: 3    lisinopril (PRINIVIL,ZESTRIL) 40 MG tablet, Take 1 tablet by mouth Daily., Disp: 90 tablet, Rfl: 3    meloxicam (MOBIC) 15 MG tablet, meloxicam 15 mg tablet  TAKE 1 TABLET BY MOUTH TWICE DAILY, Disp: , Rfl:     sertraline (ZOLOFT) 50 MG tablet, , Disp: , Rfl:       Social History     Socioeconomic History    Marital status:    Tobacco Use    Smoking status: Former     Packs/day: 1.00     Types: Cigarettes     Quit date: 2022     Years since quittin.1    Smokeless tobacco: Never   Vaping Use    Vaping Use: Never used   Substance and Sexual Activity    Alcohol use: Not Currently    Drug use: Not Currently     Types: Marijuana, Heroin     Comment: heroin , marijuana     Sexual activity: Defer         Review of Systems   Constitutional: Negative for chills and fever.   HENT:  Negative for ear discharge and nosebleeds.    Eyes:  Negative for discharge and redness.   Cardiovascular:  Negative for chest pain, orthopnea, palpitations, paroxysmal nocturnal dyspnea and syncope.   Respiratory:  Positive for shortness of breath. Negative for cough and wheezing.    Endocrine: Negative for heat intolerance.   Skin:  Negative for rash.   Musculoskeletal:  Negative for arthritis and myalgias.   Gastrointestinal:  Negative for abdominal pain, melena, nausea and vomiting.   Genitourinary:  Negative for dysuria and hematuria.   Neurological:  Negative for dizziness, light-headedness, numbness and tremors.  "  Psychiatric/Behavioral:  Negative for depression. The patient is not nervous/anxious.      Procedures      ECG 12 Lead    Date/Time: 8/25/2023 10:49 AM  Performed by: Zaki Combs MD  Authorized by: Zaki Combs MD   Comparison: compared with previous ECG   Similar to previous ECG  Rhythm: sinus rhythm    Clinical impression: normal ECG        ECG 12 Lead    (Results Pending)           Objective:    /83 (BP Location: Right arm, Patient Position: Sitting, Cuff Size: Large Adult)   Pulse 95   Ht 180.3 cm (70.98\")   Wt 81.6 kg (180 lb)   SpO2 97%   BMI 25.12 kg/mý         Constitutional:       Appearance: Well-developed.   Eyes:      General: No scleral icterus.        Right eye: No discharge.   HENT:      Head: Normocephalic and atraumatic.   Neck:      Thyroid: No thyromegaly.      Lymphadenopathy: No cervical adenopathy.   Pulmonary:      Effort: Pulmonary effort is normal. No respiratory distress.      Breath sounds: Normal breath sounds. No wheezing. No rales.   Cardiovascular:      Normal rate. Regular rhythm.      No gallop.    Edema:     Peripheral edema absent.   Abdominal:      Tenderness: There is no abdominal tenderness.   Skin:     Findings: No erythema or rash.   Neurological:      Mental Status: Alert and oriented to person, place, and time.           Assessment:       Diagnosis Plan   1. Palpitations  ECG 12 Lead      2. Benign essential HTN  ECG 12 Lead               Plan:       MDM:    1.  Hypertension:    Patient blood pressure is controlled.  Continue lisinopril and diltiazem    2.  Palpitation:    Holter monitor showed isolated premature contraction.  With diltiazem symptoms are controlled continue current treatment    3.  Anxiety disorder:    Patient is on Zoloft.  Patient is doing well  "

## 2023-08-25 ENCOUNTER — OFFICE VISIT (OUTPATIENT)
Dept: CARDIOLOGY | Facility: CLINIC | Age: 38
End: 2023-08-25
Payer: MEDICAID

## 2023-08-25 VITALS
BODY MASS INDEX: 25.2 KG/M2 | HEART RATE: 95 BPM | OXYGEN SATURATION: 97 % | SYSTOLIC BLOOD PRESSURE: 117 MMHG | HEIGHT: 71 IN | DIASTOLIC BLOOD PRESSURE: 83 MMHG | WEIGHT: 180 LBS

## 2023-08-25 DIAGNOSIS — I10 BENIGN ESSENTIAL HTN: ICD-10-CM

## 2023-08-25 DIAGNOSIS — R00.2 PALPITATIONS: Primary | ICD-10-CM

## 2023-08-25 PROCEDURE — 3079F DIAST BP 80-89 MM HG: CPT | Performed by: INTERNAL MEDICINE

## 2023-08-25 PROCEDURE — 1159F MED LIST DOCD IN RCRD: CPT | Performed by: INTERNAL MEDICINE

## 2023-08-25 PROCEDURE — 1160F RVW MEDS BY RX/DR IN RCRD: CPT | Performed by: INTERNAL MEDICINE

## 2023-08-25 PROCEDURE — 93000 ELECTROCARDIOGRAM COMPLETE: CPT | Performed by: INTERNAL MEDICINE

## 2023-08-25 PROCEDURE — 99214 OFFICE O/P EST MOD 30 MIN: CPT | Performed by: INTERNAL MEDICINE

## 2023-08-25 PROCEDURE — 3074F SYST BP LT 130 MM HG: CPT | Performed by: INTERNAL MEDICINE

## 2023-08-25 RX ORDER — DILTIAZEM HYDROCHLORIDE 180 MG/1
180 CAPSULE, COATED, EXTENDED RELEASE ORAL DAILY
COMMUNITY

## 2023-09-13 RX ORDER — ATORVASTATIN CALCIUM 40 MG/1
40 TABLET, FILM COATED ORAL DAILY
Qty: 90 TABLET | Refills: 3 | Status: SHIPPED | OUTPATIENT
Start: 2023-09-13

## 2023-09-29 RX ORDER — DILTIAZEM HYDROCHLORIDE 180 MG/1
180 CAPSULE, COATED, EXTENDED RELEASE ORAL DAILY
Qty: 90 CAPSULE | Refills: 0 | Status: SHIPPED | OUTPATIENT
Start: 2023-09-29

## 2024-01-02 RX ORDER — DILTIAZEM HYDROCHLORIDE 180 MG/1
180 CAPSULE, COATED, EXTENDED RELEASE ORAL DAILY
Qty: 90 CAPSULE | Refills: 0 | Status: SHIPPED | OUTPATIENT
Start: 2024-01-02

## 2024-02-02 ENCOUNTER — HOSPITAL ENCOUNTER (OUTPATIENT)
Dept: GENERAL RADIOLOGY | Facility: HOSPITAL | Age: 39
Discharge: HOME | End: 2024-02-02
Payer: MEDICAID

## 2024-02-02 ENCOUNTER — OFFICE VISIT (OUTPATIENT)
Dept: FAMILY MEDICINE CLINIC | Facility: CLINIC | Age: 39
End: 2024-02-02
Payer: MEDICAID

## 2024-02-02 VITALS
HEART RATE: 78 BPM | OXYGEN SATURATION: 98 % | TEMPERATURE: 98.4 F | HEIGHT: 71 IN | RESPIRATION RATE: 20 BRPM | DIASTOLIC BLOOD PRESSURE: 84 MMHG | SYSTOLIC BLOOD PRESSURE: 135 MMHG | BODY MASS INDEX: 22.6 KG/M2 | WEIGHT: 161.4 LBS

## 2024-02-02 DIAGNOSIS — Z00.00 WELLNESS EXAMINATION: Primary | ICD-10-CM

## 2024-02-02 DIAGNOSIS — E78.2 MIXED HYPERLIPIDEMIA: ICD-10-CM

## 2024-02-02 DIAGNOSIS — F17.210 TOBACCO DEPENDENCE DUE TO CIGARETTES: ICD-10-CM

## 2024-02-02 DIAGNOSIS — I10 BENIGN ESSENTIAL HTN: ICD-10-CM

## 2024-02-02 DIAGNOSIS — K92.1 BLOOD IN STOOL: ICD-10-CM

## 2024-02-02 DIAGNOSIS — F11.11 HISTORY OF OPIOID ABUSE: ICD-10-CM

## 2024-02-02 DIAGNOSIS — F33.0 MILD EPISODE OF RECURRENT MAJOR DEPRESSIVE DISORDER: ICD-10-CM

## 2024-02-02 DIAGNOSIS — B18.2 CHRONIC HEPATITIS C WITHOUT HEPATIC COMA: ICD-10-CM

## 2024-02-02 DIAGNOSIS — R63.4 WEIGHT LOSS: ICD-10-CM

## 2024-02-02 DIAGNOSIS — G89.29 CHRONIC BACK PAIN, UNSPECIFIED BACK LOCATION, UNSPECIFIED BACK PAIN LATERALITY: ICD-10-CM

## 2024-02-02 DIAGNOSIS — M54.9 CHRONIC BACK PAIN, UNSPECIFIED BACK LOCATION, UNSPECIFIED BACK PAIN LATERALITY: ICD-10-CM

## 2024-02-02 DIAGNOSIS — Z23 NEED FOR TDAP VACCINATION: ICD-10-CM

## 2024-02-02 DIAGNOSIS — Z28.21 INFLUENZA VACCINATION DECLINED: ICD-10-CM

## 2024-02-02 PROBLEM — E78.5 HYPERLIPIDEMIA: Status: ACTIVE | Noted: 2024-02-02

## 2024-02-02 PROCEDURE — 71046 X-RAY EXAM CHEST 2 VIEWS: CPT

## 2024-02-02 RX ORDER — MELOXICAM 15 MG/1
15 TABLET ORAL DAILY
Qty: 30 TABLET | Refills: 2 | Status: SHIPPED | OUTPATIENT
Start: 2024-02-02

## 2024-02-02 NOTE — PROGRESS NOTES
Chief Complaint  Annual Exam, Hypertension, Hyperlipidemia, Back Pain, and Establish Care    Subjective          Robert is a 38 y.o. male  who presents to [facility]     Patient Care Team:  Petra Miguel APRN as PCP - General (Family Medicine)  Zaki Combs MD as Consulting Physician (Cardiology)     History of Present Illness  Robert is here today to establish care.  Previous PCP Daylin Han NP.   New patient    Adult Annual Wellness    Social History    Tobacco Use      Smoking status: Every Day        Packs/day: 1.00        Years: 27.00        Additional pack years: 0.00        Total pack years: 27.00        Types: Cigarettes        Start date: 1997      Smokeless tobacco: Never    Vaping Use      Vaping Use: Never used    Alcohol use: Not Currently    Drug use: Yes      Types: Marijuana, Heroin      Comment: No opioid use since June 2023    Caffeine use - coffee, energy drinks, Mt Dew    General health habits  Last eye exam - March 2023  Last dental exam -  2 weeks ago, had teeth pulled    Diet - Regular diet    Weight / BMI - normal, BMI 22.5    Exercise - yes; 45 minutes 1-3 days per week    Hours of sleep per night ? 5-6    Safety -   Do you use seat belts consistently ? No   Working smoke detector in home ? Yes   Do you use sunscreen when outdoors ? No     Reproductive health -  Sexually active - yes   Erectile dysfunction ? No     Screening/prevention  Colon cancer screening - not applicable due to age  Prostate cancer screening (PSA) -  not applicable due to age    Immunizations -   Tdap - given today  Pneumococcal vaccine -  no  Shingles (Shingrix) - no    ++++++++++++++++++++++++    Patient presents for follow-up of HTN  This is an established problem.    Interim treatment changes: none  Current medications: lisinopril 40 mg daily, HCTZ 25 mg daily, diltiazem 180 mg daily  Checking blood pressure at home ? Yes sometimes    Patient presents for follow-up of  hyperlipidemia  This is an established problem  Interim treatment changes: started atorvastatin in Fall 2023  Current medications: atorvastatin 40 mg daily    Patient presents for follow-up of depression and anxiety  This is an established problem  Interim treatment changes: none  Current medications: sertraline 50 mg daily  PHQ-9 =  5 on 2/2/2024  PRIMITIVO-7 = 11 on 2/2/2024    Patient presents for follow-up of back pain  This is an established problem  Interim treatment changes:  Current medications: meloxicam 15 mg daily  He would like a refill of meloxicam  He has had prior imaging of his back.   He saw [facility] for his back in June 2022 and it was recommended he complete PT    Hx opioid abuse  He went to methadone clinic for 1 year  Relapse in June 2023.    Overdose x2 (both in June 2023)  Went to [facility] with second overdose  He had inpatient therapy in 2016 and 2018  Clean off opiods since June 2023        Robert Whiteside  has a past medical history of Depression, History of intravenous drug use in remission, History of opioid abuse, Hyperlipidemia, Hypertension, and Positive hepatitis C antibody test.      Review of Systems   Constitutional:  Positive for unexpected weight change. Negative for fatigue and fever.   HENT:  Negative for ear pain and sore throat.    Eyes:  Negative for visual disturbance.   Respiratory:  Positive for cough. Negative for shortness of breath.    Cardiovascular:  Positive for palpitations (improved). Negative for chest pain and leg swelling.   Gastrointestinal:  Positive for blood in stool and diarrhea (loose). Negative for abdominal distention, abdominal pain, constipation, nausea and vomiting.        No hearburn   Endocrine: Negative for cold intolerance, heat intolerance, polydipsia, polyphagia and polyuria.   Genitourinary:  Negative for dysuria, frequency, hematuria, penile discharge, scrotal swelling, testicular pain and urgency.   Musculoskeletal:  Positive for back pain.    Skin:  Negative for rash.   Neurological:  Negative for dizziness, tremors, weakness and headaches.   Psychiatric/Behavioral:  Positive for dysphoric mood. Negative for suicidal ideas. The patient is nervous/anxious.         Family History   Problem Relation Age of Onset    Cervical cancer Mother     Lung cancer Mother     Drug abuse Father     Leukemia Brother     Lung disease Maternal Grandmother     Lung cancer Maternal Grandmother     Heart disease Maternal Grandfather     Stroke Maternal Grandfather     Colon cancer Other     No Known Problems Daughter         Past Surgical History:   Procedure Laterality Date    EXCISION  04/27/2018    excision manubrio-clavicular joint; Dr. Cheung    HERNIA REPAIR Left 1986    INCISION AND DRAINAGE ABSCESS  04/27/2018    neck    WRIST FRACTURE SURGERY Left         Social History     Socioeconomic History    Marital status:     Number of children: 1    Highest education level: GED or equivalent   Tobacco Use    Smoking status: Every Day     Packs/day: 1.00     Years: 27.00     Additional pack years: 0.00     Total pack years: 27.00     Types: Cigarettes     Start date: 1997    Smokeless tobacco: Never   Vaping Use    Vaping Use: Never used   Substance and Sexual Activity    Alcohol use: Not Currently    Drug use: Yes     Types: Marijuana, Heroin     Comment: No opioid use since June 2023    Sexual activity: Yes     Partners: Female        Immunization History   Administered Date(s) Administered    DTP 02/04/1986, 04/28/1986, 07/28/1986, 06/03/1987, 11/02/1990    Hepatitis A 03/12/2019    MMR 03/23/1987, 07/17/1997    OPV 02/04/1986, 04/28/1986, 06/03/1987, 11/02/1990    Tdap 02/02/2024       Objective       Current Outpatient Medications:     atorvastatin (LIPITOR) 40 MG tablet, Take 1 tablet by mouth Daily., Disp: 90 tablet, Rfl: 3    dilTIAZem CD (CARDIZEM CD) 180 MG 24 hr capsule, Take 1 capsule by mouth once daily, Disp: 90 capsule, Rfl: 0    hydroCHLOROthiazide  "(HYDRODIURIL) 25 MG tablet, Take 1 tablet by mouth Daily., Disp: 90 tablet, Rfl: 3    lisinopril (PRINIVIL,ZESTRIL) 40 MG tablet, Take 1 tablet by mouth Daily., Disp: 90 tablet, Rfl: 3    meloxicam (MOBIC) 15 MG tablet, Take 1 tablet by mouth Daily., Disp: 30 tablet, Rfl: 2    sertraline (ZOLOFT) 50 MG tablet, , Disp: , Rfl:     Vital Signs:      /84   Pulse 78   Temp 98.4 °F (36.9 °C) (Temporal)   Resp 20   Ht 180.3 cm (70.98\")   Wt 73.2 kg (161 lb 6.4 oz)   SpO2 98%   BMI 22.52 kg/m²     Vitals:    02/02/24 1512   BP: 135/84   Pulse: 78   Resp: 20   Temp: 98.4 °F (36.9 °C)   TempSrc: Temporal   SpO2: 98%   Weight: 73.2 kg (161 lb 6.4 oz)   Height: 180.3 cm (70.98\")      Physical Exam  Vitals reviewed.   Constitutional:       General: He is not in acute distress.     Appearance: Normal appearance.   HENT:      Head: Normocephalic and atraumatic.      Right Ear: Tympanic membrane, ear canal and external ear normal.      Left Ear: Tympanic membrane, ear canal and external ear normal.      Mouth/Throat:      Mouth: Mucous membranes are moist.      Pharynx: Oropharynx is clear.   Eyes:      General: No scleral icterus.     Conjunctiva/sclera: Conjunctivae normal.   Cardiovascular:      Rate and Rhythm: Normal rate and regular rhythm.      Heart sounds: Normal heart sounds.   Pulmonary:      Effort: Pulmonary effort is normal. No respiratory distress.      Breath sounds: Normal breath sounds.   Musculoskeletal:      Cervical back: Neck supple.      Right lower leg: No edema.      Left lower leg: No edema.   Lymphadenopathy:      Cervical: No cervical adenopathy.   Skin:     General: Skin is warm and dry.   Neurological:      Mental Status: He is alert and oriented to person, place, and time.   Psychiatric:         Mood and Affect: Mood normal.        Result Review :                PHQ-9 Depression Screening  Little interest or pleasure in doing things? 1-->several days   Feeling down, depressed, or " hopeless? 1-->several days   Trouble falling or staying asleep, or sleeping too much? 1-->several days   Feeling tired or having little energy? 0-->not at all   Poor appetite or overeating? 1-->several days   Feeling bad about yourself - or that you are a failure or have let yourself or your family down? 1-->several days   Trouble concentrating on things, such as reading the newspaper or watching television? 0-->not at all   Moving or speaking so slowly that other people could have noticed? Or the opposite - being so fidgety or restless that you have been moving around a lot more than usual? 0-->not at all   Thoughts that you would be better off dead, or of hurting yourself in some way? 0-->not at all   PHQ-9 Total Score 5   If you checked off any problems, how difficult have these problems made it for you to do your work, take care of things at home, or get along with other people? somewhat difficult         Over the last two weeks, how often have you been bothered by the following problems?  Feeling nervous, anxious or on edge: More than half the days  Not being able to stop or control worrying: More than half the days  Worrying too much about different things: More than half the days  Trouble Relaxing: More than half the days  Being so restless that it is hard to sit still: Several days  Becoming easily annoyed or irritable: More than half the days  Feeling afraid as if something awful might happen: Not at all  PRIMITIVO 7 Total Score: 11  If you checked any problems, how difficult have these problems made it for you to do your work, take care of things at home, or get along with other people: Somewhat difficult           Assessment and Plan    Diagnoses and all orders for this visit:    1. Wellness examination (Primary)  Assessment & Plan:  Discussed preventative healthcare.  Labs ordered. Recommended annual eye and dental exams. Recommended use of seatbelts, sunscreen and smoke detectors in the home.  Tdap given  today.     Orders:  -     CBC & Differential  -     Comprehensive Metabolic Panel  -     Lipid Panel  -     TSH Rfx On Abnormal To Free T4    2. Benign essential HTN  Assessment & Plan:  Chronic stable problem.  Continue lisinopril 40 mg daily, HCTZ 25 mg daily and diltiazem 180 mg daily      3. Mixed hyperlipidemia  Assessment & Plan:  Established problem  Continue atorvastatin 40 mg daily  Check fasting lipid panel    Orders:  -     Lipid Panel    4. Mild episode of recurrent major depressive disorder  Assessment & Plan:  Established problem  Continue sertraline 50 mg daily      5. Chronic back pain, unspecified back location, unspecified back pain laterality  Assessment & Plan:  Established problem.  Continue meloxicam 15 mg daily    Orders:  -     meloxicam (MOBIC) 15 MG tablet; Take 1 tablet by mouth Daily.  Dispense: 30 tablet; Refill: 2    6. History of opioid abuse  Assessment & Plan:  Continue to abstain from opioid use      7. Chronic hepatitis C without hepatic coma  Overview:  HCV ,000 High   on 4/30/2018    Assessment & Plan:  He has not seen GI or had treatment for Hep C  Check HCV RNA level    Orders:  -     Hepatitis C RNA, Quantitative, PCR (graph)    8. Blood in stool  Comments:  Refer for colonoscopy  Orders:  -     Ambulatory Referral to Colorectal Surgery    9. Weight loss  Comments:  Check chest xray  Orders:  -     XR Chest PA & Lateral  -     Ambulatory Referral to Colorectal Surgery    10. Need for Tdap vaccination  -     Tdap Vaccine Greater Than or Equal To 6yo IM    11. Influenza vaccination declined  Comments:  Recommended flu vaccine and he declined.    12. Tobacco dependence due to cigarettes  Assessment & Plan:  Robert Whiteside  reports that he has been smoking cigarettes. He started smoking about 27 years ago. He has a 27.00 pack-year smoking history. He has never used smokeless tobacco.. I have educated him on the risk of diseases from using tobacco products such as cancer,  COPD, and heart disease.     I advised him to quit and he is not willing to quit.    I spent 3  minutes counseling the patient.                      Follow Up   Return in about 2 weeks (around 2/16/2024) for Recheck.  Patient was given instructions and counseling regarding his condition or for health maintenance advice. Please see specific information pulled into the AVS if appropriate.    There are no Patient Instructions on file for this visit.

## 2024-02-03 NOTE — PROGRESS NOTES
Let him know his chest xray does not show a mass, pleural effusion or pneumonia.  He has some scarring in left lung.  There is deformity of his T12-L1 vertebra.

## 2024-02-04 PROBLEM — F17.210 TOBACCO DEPENDENCE DUE TO CIGARETTES: Status: ACTIVE | Noted: 2024-02-04

## 2024-02-04 PROBLEM — M54.9 CHRONIC BACK PAIN: Status: ACTIVE | Noted: 2024-02-04

## 2024-02-04 PROBLEM — F32.A DEPRESSION: Status: ACTIVE | Noted: 2024-02-04

## 2024-02-04 PROBLEM — G89.29 CHRONIC BACK PAIN: Status: ACTIVE | Noted: 2024-02-04

## 2024-02-04 NOTE — ASSESSMENT & PLAN NOTE
Chronic stable problem.  Continue lisinopril 40 mg daily, HCTZ 25 mg daily and diltiazem 180 mg daily

## 2024-02-04 NOTE — ASSESSMENT & PLAN NOTE
Robert RENNY Mosleycisco  reports that he has been smoking cigarettes. He started smoking about 27 years ago. He has a 27.00 pack-year smoking history. He has never used smokeless tobacco.. I have educated him on the risk of diseases from using tobacco products such as cancer, COPD, and heart disease.     I advised him to quit and he is not willing to quit.    I spent 3  minutes counseling the patient.

## 2024-02-04 NOTE — ASSESSMENT & PLAN NOTE
Discussed preventative healthcare.  Labs ordered. Recommended annual eye and dental exams. Recommended use of seatbelts, sunscreen and smoke detectors in the home.  Tdap given today.

## 2024-02-05 DIAGNOSIS — N28.9 KIDNEY DISEASE: Primary | ICD-10-CM

## 2024-02-05 LAB
ALBUMIN SERPL-MCNC: 5 G/DL (ref 4.1–5.1)
ALBUMIN/GLOB SERPL: 2.3 {RATIO} (ref 1.2–2.2)
ALP SERPL-CCNC: 55 IU/L (ref 44–121)
ALT SERPL-CCNC: 18 IU/L (ref 0–44)
AST SERPL-CCNC: 21 IU/L (ref 0–40)
BASOPHILS # BLD AUTO: 0.1 X10E3/UL (ref 0–0.2)
BASOPHILS NFR BLD AUTO: 1 %
BILIRUB SERPL-MCNC: 0.4 MG/DL (ref 0–1.2)
BUN SERPL-MCNC: 22 MG/DL (ref 6–20)
BUN/CREAT SERPL: 15 (ref 9–20)
CALCIUM SERPL-MCNC: 9.4 MG/DL (ref 8.7–10.2)
CHLORIDE SERPL-SCNC: 99 MMOL/L (ref 96–106)
CHOLEST SERPL-MCNC: 116 MG/DL (ref 100–199)
CO2 SERPL-SCNC: 21 MMOL/L (ref 20–29)
CREAT SERPL-MCNC: 1.46 MG/DL (ref 0.76–1.27)
EGFRCR SERPLBLD CKD-EPI 2021: 63 ML/MIN/1.73
EOSINOPHIL # BLD AUTO: 0.1 X10E3/UL (ref 0–0.4)
EOSINOPHIL NFR BLD AUTO: 1 %
ERYTHROCYTE [DISTWIDTH] IN BLOOD BY AUTOMATED COUNT: 12.4 % (ref 11.6–15.4)
GLOBULIN SER CALC-MCNC: 2.2 G/DL (ref 1.5–4.5)
GLUCOSE SERPL-MCNC: 95 MG/DL (ref 70–99)
HCT VFR BLD AUTO: 40.6 % (ref 37.5–51)
HCV RNA SERPL NAA+PROBE-ACNC: NORMAL IU/ML
HDLC SERPL-MCNC: 33 MG/DL
HGB BLD-MCNC: 13.7 G/DL (ref 13–17.7)
IMM GRANULOCYTES # BLD AUTO: 0 X10E3/UL (ref 0–0.1)
IMM GRANULOCYTES NFR BLD AUTO: 0 %
LDLC SERPL CALC-MCNC: 60 MG/DL (ref 0–99)
LYMPHOCYTES # BLD AUTO: 2.9 X10E3/UL (ref 0.7–3.1)
LYMPHOCYTES NFR BLD AUTO: 19 %
MCH RBC QN AUTO: 31.9 PG (ref 26.6–33)
MCHC RBC AUTO-ENTMCNC: 33.7 G/DL (ref 31.5–35.7)
MCV RBC AUTO: 95 FL (ref 79–97)
MONOCYTES # BLD AUTO: 0.7 X10E3/UL (ref 0.1–0.9)
MONOCYTES NFR BLD AUTO: 5 %
NEUTROPHILS # BLD AUTO: 11.6 X10E3/UL (ref 1.4–7)
NEUTROPHILS NFR BLD AUTO: 74 %
PLATELET # BLD AUTO: 209 X10E3/UL (ref 150–450)
POTASSIUM SERPL-SCNC: 4.5 MMOL/L (ref 3.5–5.2)
PROT SERPL-MCNC: 7.2 G/DL (ref 6–8.5)
RBC # BLD AUTO: 4.29 X10E6/UL (ref 4.14–5.8)
SODIUM SERPL-SCNC: 138 MMOL/L (ref 134–144)
T4 FREE SERPL-MCNC: 1.44 NG/DL (ref 0.82–1.77)
TEST INFORMATION: NORMAL
TRIGL SERPL-MCNC: 129 MG/DL (ref 0–149)
TSH SERPL DL<=0.005 MIU/L-ACNC: 0.4 UIU/ML (ref 0.45–4.5)
VLDLC SERPL CALC-MCNC: 23 MG/DL (ref 5–40)
WBC # BLD AUTO: 15.4 X10E3/UL (ref 3.4–10.8)

## 2024-02-08 DIAGNOSIS — R79.89 LOW TSH LEVEL: Primary | ICD-10-CM

## 2024-02-08 DIAGNOSIS — F41.9 ANXIETY: ICD-10-CM

## 2024-02-08 DIAGNOSIS — R63.4 WEIGHT LOSS: ICD-10-CM

## 2024-02-16 ENCOUNTER — OFFICE VISIT (OUTPATIENT)
Dept: FAMILY MEDICINE CLINIC | Facility: CLINIC | Age: 39
End: 2024-02-16
Payer: MEDICAID

## 2024-02-16 VITALS
HEART RATE: 75 BPM | HEIGHT: 71 IN | BODY MASS INDEX: 22.54 KG/M2 | OXYGEN SATURATION: 99 % | SYSTOLIC BLOOD PRESSURE: 135 MMHG | RESPIRATION RATE: 20 BRPM | DIASTOLIC BLOOD PRESSURE: 83 MMHG | WEIGHT: 161 LBS | TEMPERATURE: 97.5 F

## 2024-02-16 DIAGNOSIS — R79.89 ELEVATED SERUM CREATININE: ICD-10-CM

## 2024-02-16 DIAGNOSIS — F11.11 HISTORY OF OPIOID ABUSE: ICD-10-CM

## 2024-02-16 DIAGNOSIS — F33.0 MILD EPISODE OF RECURRENT MAJOR DEPRESSIVE DISORDER: ICD-10-CM

## 2024-02-16 DIAGNOSIS — R79.89 LOW TSH LEVEL: ICD-10-CM

## 2024-02-16 DIAGNOSIS — F41.9 ANXIETY: ICD-10-CM

## 2024-02-16 DIAGNOSIS — I10 PRIMARY HYPERTENSION: ICD-10-CM

## 2024-02-16 DIAGNOSIS — E78.2 MIXED HYPERLIPIDEMIA: ICD-10-CM

## 2024-02-16 DIAGNOSIS — G89.29 CHRONIC BACK PAIN, UNSPECIFIED BACK LOCATION, UNSPECIFIED BACK PAIN LATERALITY: Primary | ICD-10-CM

## 2024-02-16 DIAGNOSIS — D72.829 LEUKOCYTOSIS, UNSPECIFIED TYPE: ICD-10-CM

## 2024-02-16 DIAGNOSIS — M54.9 CHRONIC BACK PAIN, UNSPECIFIED BACK LOCATION, UNSPECIFIED BACK PAIN LATERALITY: Primary | ICD-10-CM

## 2024-02-16 PROCEDURE — 1160F RVW MEDS BY RX/DR IN RCRD: CPT | Performed by: NURSE PRACTITIONER

## 2024-02-16 PROCEDURE — 99214 OFFICE O/P EST MOD 30 MIN: CPT | Performed by: NURSE PRACTITIONER

## 2024-02-16 PROCEDURE — 1159F MED LIST DOCD IN RCRD: CPT | Performed by: NURSE PRACTITIONER

## 2024-02-16 PROCEDURE — 3075F SYST BP GE 130 - 139MM HG: CPT | Performed by: NURSE PRACTITIONER

## 2024-02-16 PROCEDURE — 3079F DIAST BP 80-89 MM HG: CPT | Performed by: NURSE PRACTITIONER

## 2024-02-16 RX ORDER — HYDROXYZINE HYDROCHLORIDE 25 MG/1
25 TABLET, FILM COATED ORAL EVERY 6 HOURS PRN
Qty: 90 TABLET | Refills: 0 | Status: SHIPPED | OUTPATIENT
Start: 2024-02-16

## 2024-02-16 RX ORDER — SERTRALINE HYDROCHLORIDE 100 MG/1
100 TABLET, FILM COATED ORAL DAILY
Qty: 90 TABLET | Refills: 1 | Status: SHIPPED | OUTPATIENT
Start: 2024-02-16

## 2024-02-18 PROBLEM — F41.9 ANXIETY: Status: ACTIVE | Noted: 2024-02-18

## 2024-02-22 ENCOUNTER — OFFICE VISIT (OUTPATIENT)
Age: 39
End: 2024-02-22
Payer: MEDICAID

## 2024-02-22 VITALS
BODY MASS INDEX: 22.96 KG/M2 | HEART RATE: 70 BPM | OXYGEN SATURATION: 98 % | DIASTOLIC BLOOD PRESSURE: 99 MMHG | TEMPERATURE: 98 F | HEIGHT: 71 IN | WEIGHT: 164 LBS | SYSTOLIC BLOOD PRESSURE: 141 MMHG

## 2024-02-22 DIAGNOSIS — K62.5 BLOOD PER RECTUM: ICD-10-CM

## 2024-02-22 DIAGNOSIS — R63.4 WEIGHT LOSS: ICD-10-CM

## 2024-02-22 DIAGNOSIS — R19.8 IRREGULAR BOWEL HABITS: Primary | ICD-10-CM

## 2024-02-22 RX ORDER — SODIUM, POTASSIUM,MAG SULFATES 17.5-3.13G
SOLUTION, RECONSTITUTED, ORAL ORAL
Qty: 177 ML | Refills: 0 | Status: SHIPPED | OUTPATIENT
Start: 2024-02-22

## 2024-02-22 RX ORDER — SODIUM CHLORIDE, SODIUM LACTATE, POTASSIUM CHLORIDE, CALCIUM CHLORIDE 600; 310; 30; 20 MG/100ML; MG/100ML; MG/100ML; MG/100ML
30 INJECTION, SOLUTION INTRAVENOUS CONTINUOUS
OUTPATIENT
Start: 2024-02-22

## 2024-02-22 NOTE — PROGRESS NOTES
Colorectal Surgery Consultation Note    ID:  Robert Whiteside;   : 1985  DATE OF VISIT: 2024    Chief Complaint  Consult (NP Referred by INES MARTINEZ- weight loss, blood in stool)       History of Present Illness  Robert Whtieside is a 38 y.o. male who I was asked to see in consultation by Petra Vázquez APRN for anorectal bleeding and changes in bowel habits.     Robert reports for the last 8 months his bowel movement has been loose and diarrhea.  He denies pain with bowel movement.  He reports occasional blood with stool.  He reports weight loss of 25 pounds in the last 8-month.  He also reports abdominal pain and cramping which is not associated with diet.  Pain is cramping in nature and is usually in the right upper and left upper quadrants.  Denies itching around the anus    He reports smoking history of 1 pack/day.  He denies daily consumption of caffeinated beverages and sweets like chocolate.  He reports stress in his life however that has not changed recently.     Patient does not take supplemental fiber.    Last colonoscopy: Never had colonoscopy.    Denies any family history of inflammatory bowel disease, colorectal cancer.      Past Medical History  Past Medical History:   Diagnosis Date    Depression     History of intravenous drug use in remission     History of opioid abuse     Hyperlipidemia     Hypertension     Positive hepatitis C antibody test      Past Surgical History  Past Surgical History:   Procedure Laterality Date    EXCISION  2018    excision manubrio-clavicular joint; Dr. Cheung    HERNIA REPAIR Left 1986    INCISION AND DRAINAGE ABSCESS  2018    neck    WRIST FRACTURE SURGERY Left      Family History  Family History   Problem Relation Age of Onset    Cervical cancer Mother     Lung cancer Mother     Drug abuse Father     Leukemia Brother     Lung disease Maternal Grandmother     Lung cancer Maternal Grandmother     Heart disease Maternal Grandfather      Stroke Maternal Grandfather     Colon cancer Other     No Known Problems Daughter      No colorectal cancer history in immediate family members.  Social History  Social History     Tobacco Use    Smoking status: Every Day     Packs/day: 1.00     Years: 27.00     Additional pack years: 0.00     Total pack years: 27.00     Types: Cigarettes     Start date: 1997    Smokeless tobacco: Never   Vaping Use    Vaping Use: Never used   Substance Use Topics    Alcohol use: Not Currently    Drug use: Yes     Types: Marijuana, Heroin     Comment: No opioid use since June 2023     For further details please see Health History Questionnaire scanned in Epic.  Medication List  [unfilled]  Allergies  No Known Allergies  Review of Systems  All systems reviewed and are otherwise negative, pertinent positives noted in the HPI and Health History Questionnaire scanned in Epic.    Physical Exam  General:  No acute distress  Head: Normocephalic, atraumatic  Neuro: Alert and oriented    Abdomen:  Soft, non-tender, non-distended, no hernias, no hepatomegaly, no splenomegaly. No abnormal, audible bowel sounds.    External anorectal exam: mild skin irritation, external tags located left posterior  Digital rectal: no tenderness with exam, no palpable masses, tone is normal    Procedure Note  Procedure: anoscopy  Indication: rectal bleeding  Description: After digital examination was completed the scope was inserted into the anal canal. The rectum/anus was visualized to 5 cm. See Findings below. The scope was then removed. Patient tolerated procedure well.  Findings: prominent, prolapsing internal hemorrhoids in the classic quadrants, otherwise no other mucosal lesions visualized.    Assessment  -Symptomatic internal hemorrhoids  -Anorectal bleeding   -Irregular bowel habits    Plan / Recommendations    1.  Given change in bowel habits, weight loss, bleeding with bowel movements it is prudent to perform colonoscopy.  Likely etiologies  including dietary and life changes, inflammatory bowel disease, as well as potential malignancy were discussed with patient.     2. In order to help with his bowel movements a high fiber diet is encouraged along with supplemental fiber in the form of Citrucel or Metamucil or any of the other psyllium based products. I have recommended that he start by taking 2 teaspoons in a glass of water once per day for a week and to gradually work up to taking it twice per day. I explained that it is normal to have increased gas and bloating when first starting on fiber, but that this ought to get better after ~ 3 weeks to 3 months.     I have also counseled him in his smoking habits as this will contribute to his diarrhea.  In addition, I have also counseled him to limit caffeinated beverages, spicy food, and sweets.     3. Will have patient followup in 3-4 weeks for repeat evaluation after colonoscopy.       Chele Penn MD  Colon and Rectal Surgery   [facility]

## 2024-03-05 ENCOUNTER — OFFICE VISIT (OUTPATIENT)
Dept: ENDOCRINOLOGY | Facility: CLINIC | Age: 39
End: 2024-03-05
Payer: MEDICAID

## 2024-03-05 ENCOUNTER — LAB (OUTPATIENT)
Dept: LAB | Facility: HOSPITAL | Age: 39
End: 2024-03-05
Payer: MEDICAID

## 2024-03-05 VITALS
BODY MASS INDEX: 22.96 KG/M2 | DIASTOLIC BLOOD PRESSURE: 88 MMHG | WEIGHT: 164 LBS | SYSTOLIC BLOOD PRESSURE: 134 MMHG | OXYGEN SATURATION: 98 % | HEART RATE: 70 BPM | HEIGHT: 71 IN

## 2024-03-05 DIAGNOSIS — R94.6 ABNORMAL THYROID FUNCTION TEST: Primary | ICD-10-CM

## 2024-03-05 DIAGNOSIS — I10 HYPERTENSION, UNSPECIFIED TYPE: ICD-10-CM

## 2024-03-05 DIAGNOSIS — N17.9 AKI (ACUTE KIDNEY INJURY): ICD-10-CM

## 2024-03-05 DIAGNOSIS — R94.6 ABNORMAL THYROID FUNCTION TEST: ICD-10-CM

## 2024-03-05 LAB
ANION GAP SERPL CALCULATED.3IONS-SCNC: 10.1 MMOL/L (ref 5–15)
BUN SERPL-MCNC: 12 MG/DL (ref 6–20)
BUN/CREAT SERPL: 12.4 (ref 7–25)
CALCIUM SPEC-SCNC: 9.2 MG/DL (ref 8.6–10.5)
CHLORIDE SERPL-SCNC: 104 MMOL/L (ref 98–107)
CO2 SERPL-SCNC: 25.9 MMOL/L (ref 22–29)
CREAT SERPL-MCNC: 0.97 MG/DL (ref 0.76–1.27)
EGFRCR SERPLBLD CKD-EPI 2021: 102.5 ML/MIN/1.73
GLUCOSE SERPL-MCNC: 83 MG/DL (ref 65–99)
POTASSIUM SERPL-SCNC: 4.8 MMOL/L (ref 3.5–5.2)
SODIUM SERPL-SCNC: 140 MMOL/L (ref 136–145)
T4 FREE SERPL-MCNC: 1.08 NG/DL (ref 0.93–1.7)
TSH SERPL DL<=0.05 MIU/L-ACNC: 0.89 UIU/ML (ref 0.27–4.2)

## 2024-03-05 PROCEDURE — 82088 ASSAY OF ALDOSTERONE: CPT

## 2024-03-05 PROCEDURE — 84439 ASSAY OF FREE THYROXINE: CPT

## 2024-03-05 PROCEDURE — 99204 OFFICE O/P NEW MOD 45 MIN: CPT | Performed by: INTERNAL MEDICINE

## 2024-03-05 PROCEDURE — 84244 ASSAY OF RENIN: CPT

## 2024-03-05 PROCEDURE — 3075F SYST BP GE 130 - 139MM HG: CPT | Performed by: INTERNAL MEDICINE

## 2024-03-05 PROCEDURE — 36415 COLL VENOUS BLD VENIPUNCTURE: CPT

## 2024-03-05 PROCEDURE — 3079F DIAST BP 80-89 MM HG: CPT | Performed by: INTERNAL MEDICINE

## 2024-03-05 PROCEDURE — 80048 BASIC METABOLIC PNL TOTAL CA: CPT

## 2024-03-05 PROCEDURE — 1159F MED LIST DOCD IN RCRD: CPT | Performed by: INTERNAL MEDICINE

## 2024-03-05 PROCEDURE — 1160F RVW MEDS BY RX/DR IN RCRD: CPT | Performed by: INTERNAL MEDICINE

## 2024-03-05 PROCEDURE — 84443 ASSAY THYROID STIM HORMONE: CPT

## 2024-03-05 PROCEDURE — 82384 ASSAY THREE CATECHOLAMINES: CPT

## 2024-03-05 NOTE — PATIENT INSTRUCTIONS
Please,    - Please get blood work done today.  - Depending on your blood work result will plan for further evaluation and treatment if needed.    Please schedule a tentative follow-up with me in 3 months time.    Thank you for your visit today.    If you have any questions or concerns please feel free to reach out of the office.

## 2024-03-05 NOTE — PROGRESS NOTES
-----------------------------------------------------------------  ENDOCRINE CLINIC NOTE  -----------------------------------------------------------------        PATIENT NAME: Robert Whiteside  PATIENT : 1985 AGE: 38 y.o.  MRN NUMBER: [id number]  PRIMARY CARE: Petra Miguel APRN    ==========================================================================    CHIEF COMPLAINT: Abnormal thyroid labs  DATE OF SERVICE: 24    ==========================================================================    HPI / SUBJECTIVE    38 y.o. male is seen in the clinic today for abnormal thyroid labs.  He recently had annual physical blood work done which showed evidence of suppressed TSH and normal free T4 and was referred to endocrinology clinic for further evaluation.    - Tremors: -ve  - Palpitations: intermittently with activity  - Shortness of breath: -ve  - Heat Intolerance: -ve  - Increased Perspiration: +ve  - Weight loss: +ve, lost around 25 lbs  - Appetite: only eating one time a day, low appetite as well  - Sleep: reports insomnia with problem maintaining sleep  - Skin / Hair changes: -ve  - Vision changes (Dry eyes): -ve  - Neck Swelling: possible   - Family hx of thyroid problems: -ve    Not on any OTC supplements.    ==========================================================================                                                PAST MEDICAL HISTORY    Past Medical History:   Diagnosis Date    Depression     History of intravenous drug use in remission     History of opioid abuse     Hyperlipidemia     Hypertension     Positive hepatitis C antibody test        ==========================================================================    PAST SURGICAL HISTORY    Past Surgical History:   Procedure Laterality Date    EXCISION  2018    excision manubrio-clavicular joint; Dr. Cheung    HERNIA REPAIR Left 1986    INCISION AND DRAINAGE ABSCESS  2018    neck    WRIST FRACTURE SURGERY Left         ==========================================================================    FAMILY HISTORY    Family History   Problem Relation Age of Onset    Cervical cancer Mother     Lung cancer Mother     Drug abuse Father     Leukemia Brother     Lung disease Maternal Grandmother     Lung cancer Maternal Grandmother     Heart disease Maternal Grandfather     Stroke Maternal Grandfather     Colon cancer Other     No Known Problems Daughter        ==========================================================================    SOCIAL HISTORY    Social History     Socioeconomic History    Marital status:     Number of children: 1    Highest education level: GED or equivalent   Tobacco Use    Smoking status: Every Day     Current packs/day: 1.00     Average packs/day: 1 pack/day for 27.2 years (27.2 ttl pk-yrs)     Types: Cigarettes     Start date: 1997    Smokeless tobacco: Never   Vaping Use    Vaping status: Never Used   Substance and Sexual Activity    Alcohol use: Not Currently    Drug use: Yes     Types: Marijuana, Heroin     Comment: No opioid use since June 2023    Sexual activity: Yes     Partners: Female       ==========================================================================    MEDICATIONS      Current Outpatient Medications:     atorvastatin (LIPITOR) 40 MG tablet, Take 1 tablet by mouth Daily., Disp: 90 tablet, Rfl: 3    dilTIAZem CD (CARDIZEM CD) 180 MG 24 hr capsule, Take 1 capsule by mouth once daily, Disp: 90 capsule, Rfl: 0    hydrOXYzine (ATARAX) 25 MG tablet, Take 1 tablet by mouth Every 6 (Six) Hours As Needed for Anxiety. Do not drive or operate heavy machinery while taking, Disp: 90 tablet, Rfl: 0    lisinopril (PRINIVIL,ZESTRIL) 40 MG tablet, Take 1 tablet by mouth Daily., Disp: 90 tablet, Rfl: 3    meloxicam (MOBIC) 15 MG tablet, Take 1 tablet by mouth Daily., Disp: 30 tablet, Rfl: 2    sertraline (ZOLOFT) 100 MG tablet, Take 1 tablet by mouth Daily., Disp: 90 tablet, Rfl:  "1    ==========================================================================    ALLERGIES    No Known Allergies    ==========================================================================    OBJECTIVE    Vitals:    03/05/24 0838   BP: 134/88   Pulse: 70   SpO2: 98%     Body mass index is 22.89 kg/m².     General: Alert, cooperative, no acute distress    ==========================================================================    LAB EVALUATION    Lab Results   Component Value Date    GLUCOSE 95 02/02/2024    BUN 22 (H) 02/02/2024    CREATININE 1.46 (H) 02/02/2024    BCR 15 02/02/2024    K 4.5 02/02/2024    CO2 21 02/02/2024    CALCIUM 9.4 02/02/2024    PROTENTOTREF 7.2 02/02/2024    ALBUMIN 5.0 02/02/2024    LABIL2 2.3 (H) 02/02/2024    AST 21 02/02/2024    ALT 18 02/02/2024    TRIG 129 02/02/2024    HDL 33 (L) 02/02/2024    LDL 60 02/02/2024     No results found for: \"HGBA1C\"  Lab Results   Component Value Date    CREATININE 1.46 (H) 02/02/2024     Lab Results   Component Value Date    TSH 0.401 (L) 02/02/2024    FREET4 1.44 02/02/2024       ==========================================================================    ASSESSMENT AND PLAN    # Abnormal thyroid function  - Discussed with patient in detail about pathophysiology of thyroid function and thyroid control  - On examination there is no palpable thyroid gland or thyroid nodule  - Patient have no significant family history of thyroid disorders as well  - Will repeat TSH and free T4 levels today and further evaluation accordingly    # Hypertension, patient currently is maintained on lisinopril and Cardizem therapy, following cardiology as outpatient  - Given young age and diagnosis of hypertension will order spot catecholamine levels along with renin, aldosterone and BMP levels    # Acute kidney injury, patient is scheduled to see nephrology as outpatient    Thank you for courtesy of consultation.    Return to clinic: 3 months    Entire assessment and " "plan was discussed and counseled the patient in detail to which patient verbalized understanding and agreed with care.  Answered all queries and concerns.    This note was created using voice recognition software and is inherently subject to errors including those of syntax and \"sound-alike\" substitutions which may escape proofreading.  In such instances, original meaning may be extrapolated by contextual derivation.    Note: Portions of this note may have been copied from previous notes but documentation have been reviewed and edited as necessary to support clinical decision making for today's visit.    ==========================================================================    INFORMATION PROVIDED TO PATIENT    Patient Instructions   Please,    - Please get blood work done today.  - Depending on your blood work result will plan for further evaluation and treatment if needed.    Please schedule a tentative follow-up with me in 3 months time.    Thank you for your visit today.    If you have any questions or concerns please feel free to reach out of the office.       ==========================================================================  Biju Salmon MD  Department of Endocrine, Diabetes and Metabolism  [place], [place]  ==========================================================================  "

## 2024-03-08 ENCOUNTER — PATIENT ROUNDING (BHMG ONLY) (OUTPATIENT)
Dept: ENDOCRINOLOGY | Facility: CLINIC | Age: 39
End: 2024-03-08
Payer: MEDICAID

## 2024-03-08 NOTE — PROGRESS NOTES
March 8, 2024    Hello, may I speak with Robert Whiteside?    My name is Kati      I am  with [facility]  [address] [facility] [place] [address].    Before we get started may I verify your date of birth? 1985    I am calling to officially welcome you to our practice and ask about your recent visit. Is this a good time to talk? yes    Tell me about your visit with us. What things went well?  it was good not bad       We're always looking for ways to make our patients' experiences even better. Do you have recommendations on ways we may improve?  no    Overall were you satisfied with your first visit to our practice? yes       I appreciate you taking the time to speak with me today. Is there anything else I can do for you? no      Thank you, and have a great day.       no

## 2024-03-11 LAB
ALDOST SERPL-MCNC: 1.1 NG/DL (ref 0–30)
DOPAMINE SERPL-MCNC: <30 PG/ML (ref 0–48)
EPINEPH PLAS-MCNC: 60 PG/ML (ref 0–62)
NOREPINEPH PLAS-MCNC: 201 PG/ML (ref 0–874)

## 2024-03-12 ENCOUNTER — TELEPHONE (OUTPATIENT)
Age: 39
End: 2024-03-12
Payer: MEDICAID

## 2024-03-12 NOTE — TELEPHONE ENCOUNTER
Patients girlfriend (is on HIPAA) called. Suprep that was sent to the pharmacy is to expensive for the patient to . Can Golytely be sent to see if insurance will cover?

## 2024-03-13 LAB — RENIN PLAS-CCNC: 0.36 NG/ML/HR (ref 0.17–5.38)

## 2024-03-14 RX ORDER — POLYETHYLENE GLYCOL 3350, SODIUM SULFATE ANHYDROUS, SODIUM BICARBONATE, SODIUM CHLORIDE, POTASSIUM CHLORIDE 236; 22.74; 6.74; 5.86; 2.97 G/4L; G/4L; G/4L; G/4L; G/4L
4 POWDER, FOR SOLUTION ORAL ONCE
Qty: 1 ML | Refills: 0 | Status: SHIPPED | OUTPATIENT
Start: 2024-03-14 | End: 2024-03-14

## 2024-04-08 RX ORDER — DILTIAZEM HYDROCHLORIDE 180 MG/1
180 CAPSULE, COATED, EXTENDED RELEASE ORAL DAILY
Qty: 90 CAPSULE | Refills: 0 | Status: SHIPPED | OUTPATIENT
Start: 2024-04-08

## 2024-04-15 RX ORDER — LISINOPRIL 40 MG/1
40 TABLET ORAL DAILY
Qty: 90 TABLET | Refills: 3 | Status: SHIPPED | OUTPATIENT
Start: 2024-04-15

## 2024-05-02 ENCOUNTER — TELEPHONE (OUTPATIENT)
Dept: FAMILY MEDICINE CLINIC | Facility: CLINIC | Age: 39
End: 2024-05-02
Payer: MEDICAID

## 2024-08-19 DIAGNOSIS — F33.0 MILD EPISODE OF RECURRENT MAJOR DEPRESSIVE DISORDER: ICD-10-CM

## 2024-08-19 RX ORDER — SERTRALINE HYDROCHLORIDE 100 MG/1
100 TABLET, FILM COATED ORAL DAILY
Qty: 90 TABLET | Refills: 0 | Status: SHIPPED | OUTPATIENT
Start: 2024-08-19

## 2024-08-19 RX ORDER — DILTIAZEM HYDROCHLORIDE 180 MG/1
180 CAPSULE, EXTENDED RELEASE ORAL DAILY
Qty: 90 CAPSULE | Refills: 0 | Status: SHIPPED | OUTPATIENT
Start: 2024-08-19

## 2024-10-22 ENCOUNTER — TELEPHONE (OUTPATIENT)
Dept: FAMILY MEDICINE CLINIC | Facility: CLINIC | Age: 39
End: 2024-10-22
Payer: MEDICAID

## 2024-10-22 NOTE — TELEPHONE ENCOUNTER
"I tried to call patient and unable to lvm             In reference to Pain Management Referral:  Petra Miguel APRN Jones, Elizabeth, CMA  I ordered xrays on 2/16/2024 that he did not get done.  You can put the orders in again if he agrees to have imaging done.          Previous Messages       ----- Message -----  From: Kelly Tineo CMA  Sent: 10/22/2024  11:24 AM EDT  To: MICHELLE Ruvalcaba  Subject: FW: REFERRAL                                    If you want to order at least a xray I will call the patient and let him know they will not schedule until updated imaging is complete  ----- Message -----  From: Carlos Matthews RegSched Rep  Sent: 10/18/2024   2:24 PM EDT  To: Kelly Tineo CMA  Subject: REFERRAL                                        [facility], PER PREVIOUS COMMS,\"ROUTING BACK TO REFERRING PER PREV COMMUNICATION.  NEED UPDATED IMAGING.\"      PLEASE ADVISE    THANK YOU, CARLOS  "

## 2024-10-23 NOTE — TELEPHONE ENCOUNTER
Please ask him if he still wants the referral.    I ordered xrays on 2/16/2024 that he did not get done.     If he agrees to have imaging done, I can reorder xrays.

## 2024-10-29 ENCOUNTER — OFFICE VISIT (OUTPATIENT)
Dept: CARDIOLOGY | Facility: CLINIC | Age: 39
End: 2024-10-29
Payer: MEDICAID

## 2024-10-29 VITALS
OXYGEN SATURATION: 98 % | DIASTOLIC BLOOD PRESSURE: 74 MMHG | RESPIRATION RATE: 18 BRPM | HEIGHT: 71 IN | SYSTOLIC BLOOD PRESSURE: 120 MMHG | BODY MASS INDEX: 22.06 KG/M2 | HEART RATE: 65 BPM | WEIGHT: 157.6 LBS

## 2024-10-29 DIAGNOSIS — I10 PRIMARY HYPERTENSION: Primary | ICD-10-CM

## 2024-10-29 DIAGNOSIS — E78.2 MIXED HYPERLIPIDEMIA: ICD-10-CM

## 2024-10-29 DIAGNOSIS — R00.2 PALPITATIONS: ICD-10-CM

## 2024-10-29 PROCEDURE — 3074F SYST BP LT 130 MM HG: CPT | Performed by: NURSE PRACTITIONER

## 2024-10-29 PROCEDURE — 93000 ELECTROCARDIOGRAM COMPLETE: CPT | Performed by: NURSE PRACTITIONER

## 2024-10-29 PROCEDURE — 99213 OFFICE O/P EST LOW 20 MIN: CPT | Performed by: NURSE PRACTITIONER

## 2024-10-29 PROCEDURE — 3078F DIAST BP <80 MM HG: CPT | Performed by: NURSE PRACTITIONER

## 2024-10-29 RX ORDER — DILTIAZEM HYDROCHLORIDE 180 MG/1
180 CAPSULE, COATED, EXTENDED RELEASE ORAL DAILY
Qty: 90 CAPSULE | Refills: 3 | Status: SHIPPED | OUTPATIENT
Start: 2024-10-29

## 2024-10-29 RX ORDER — LISINOPRIL 40 MG/1
40 TABLET ORAL DAILY
Qty: 90 TABLET | Refills: 3 | Status: SHIPPED | OUTPATIENT
Start: 2024-10-29

## 2024-10-29 NOTE — PROGRESS NOTES
Cardiology Office Follow Up Visit      Primary Care Provider:  Petra Miguel APRN    Reason for f/u:     Hypertension  Dyslipidemia  Previous palpitations      Subjective     CC:    No chest pain or dyspnea    History of Present Illness       Robert Whiteside is a 38 y.o. male.  Patient is a 38-year-old male routinely seen by Dr. Combs who is known to have a history of hypertension.  He had previous chest pain with palpitations.    Patient was found to have a blood pressure in August 2022 and initially started on lisinopril 10 mg daily.    He was evaluated in the office in September 2022 and started on Bystolic in addition to lisinopril.    Review of previous cardiac testing includes echocardiogram last done in April 2023.  His EF was 55 to 60%.  RVSP was mildly elevated at 35 to 45 mmHg.  There was trace to mild TR, trace MR.    We reviewed his most recent lipid panel which shows his LDL cholesterol to be 60 HDL in the 30s total cholesterol within normal range.  Triglyceride within normal limits.    Patient has not had any recurrent recent chest pain, worsening dyspnea, PND, orthopnea, palpitations, near syncope, lower extremity edema or feelings of his heart racing.  He reports compliance with prescribed medical therapy.      ASSESSMENT/PLAN:      Diagnoses and all orders for this visit:    1. Primary hypertension (Primary)    2. Mixed hyperlipidemia    3. Palpitations            MEDICAL DECISION MAKING:    Patient appears to be well compensated.  His blood pressure is well-controlled.  We reviewed his last lipid panel from February which was stable.  I made no changes in his medical therapy.  He is having no new symptoms.  No symptoms suggestive of angina.  If he develops any new or worsening problems of asked him to contact the office sooner otherwise we will see him back for scheduled follow-up in 1 year        Past Medical History:   Diagnosis Date    Depression     History of intravenous drug use in  remission     History of opioid abuse     Hyperlipidemia     Hypertension     JAYLENE    Positive hepatitis C antibody test        Past Surgical History:   Procedure Laterality Date    EXCISION  04/27/2018    excision manubrio-clavicular joint; Dr. Cheung    HERNIA REPAIR Left 1986    INCISION AND DRAINAGE ABSCESS  04/27/2018    neck    WRIST FRACTURE SURGERY Left          Current Outpatient Medications:     Cartia  MG 24 hr capsule, Take 1 capsule by mouth once daily, Disp: 90 capsule, Rfl: 0    ibuprofen (ADVIL,MOTRIN) 800 MG tablet, Take 1 tablet by mouth 3 (Three) Times a Day As Needed for Mild Pain or Moderate Pain., Disp: 30 tablet, Rfl: 0    lisinopril (PRINIVIL,ZESTRIL) 40 MG tablet, Take 1 tablet by mouth Daily., Disp: 90 tablet, Rfl: 3    sertraline (ZOLOFT) 100 MG tablet, Take 1 tablet by mouth once daily, Disp: 90 tablet, Rfl: 0    Social History     Socioeconomic History    Marital status:     Number of children: 1    Highest education level: GED or equivalent   Tobacco Use    Smoking status: Every Day     Current packs/day: 1.00     Average packs/day: 1 pack/day for 27.8 years (27.8 ttl pk-yrs)     Types: Cigarettes     Start date: 1997     Passive exposure: Current    Smokeless tobacco: Never    Tobacco comments:     Patient advised to quit smoking   Vaping Use    Vaping status: Never Used   Substance and Sexual Activity    Alcohol use: Not Currently    Drug use: Yes     Types: Marijuana, Heroin     Comment: No opioid use since June 2023    Sexual activity: Yes     Partners: Female       Family History   Problem Relation Age of Onset    Cervical cancer Mother     Lung cancer Mother     Drug abuse Father     Leukemia Brother     Lung disease Maternal Grandmother     Lung cancer Maternal Grandmother     Heart disease Maternal Grandfather     Stroke Maternal Grandfather     Colon cancer Other     No Known Problems Daughter        The following portions of the patient's history were reviewed and  "updated as appropriate: allergies, current medications, past family history, past medical history, past social history, past surgical history and problem list.    Review of Systems   All other systems reviewed and are negative.      Pertinent items are noted in HPI, all other systems reviewed and negative    /74 (BP Location: Right arm, Patient Position: Sitting, Cuff Size: Large Adult)   Pulse 65   Resp 18   Ht 180.3 cm (71\")   Wt 71.5 kg (157 lb 9.6 oz)   SpO2 98%   BMI 21.98 kg/m² .  Objective     Vitals reviewed.   Constitutional:       General: Not in acute distress.     Appearance: Normal appearance. Well-developed.   Eyes:      Pupils: Pupils are equal, round, and reactive to light.   HENT:      Head: Normocephalic and atraumatic.   Neck:      Vascular: No JVD.   Pulmonary:      Effort: Pulmonary effort is normal.      Breath sounds: Normal breath sounds.   Cardiovascular:      Normal rate. Regular rhythm.   Edema:     Peripheral edema absent.   Abdominal:      General: There is no distension.      Palpations: Abdomen is soft.      Tenderness: There is no abdominal tenderness.   Musculoskeletal: Normal range of motion.      Cervical back: Normal range of motion and neck supple. Skin:     General: Skin is warm and dry.   Neurological:      Mental Status: Alert and oriented to person, place, and time.             ECG 12 Lead    Date/Time: 10/29/2024 4:13 PM  Performed by: Negin Vallecillo APRN    Authorized by: Negin Vallecillo APRN  Rhythm: sinus rhythm  Rate: normal  BPM: 65  Conduction: conduction normal  ST Segments: ST segments normal  QRS axis: normal    Clinical impression: normal ECG          EKG ordered by and reviewed by me in office                        "

## 2024-11-11 DIAGNOSIS — F33.0 MILD EPISODE OF RECURRENT MAJOR DEPRESSIVE DISORDER: ICD-10-CM

## 2024-11-11 RX ORDER — SERTRALINE HYDROCHLORIDE 100 MG/1
100 TABLET, FILM COATED ORAL DAILY
Qty: 90 TABLET | Refills: 0 | Status: SHIPPED | OUTPATIENT
Start: 2024-11-11 | End: 2024-11-18

## 2024-11-17 DIAGNOSIS — F33.0 MILD EPISODE OF RECURRENT MAJOR DEPRESSIVE DISORDER: ICD-10-CM

## 2024-11-18 RX ORDER — SERTRALINE HYDROCHLORIDE 100 MG/1
100 TABLET, FILM COATED ORAL DAILY
Qty: 90 TABLET | Refills: 0 | Status: SHIPPED | OUTPATIENT
Start: 2024-11-18